# Patient Record
Sex: FEMALE | Race: OTHER | HISPANIC OR LATINO | Employment: UNEMPLOYED | URBAN - METROPOLITAN AREA
[De-identification: names, ages, dates, MRNs, and addresses within clinical notes are randomized per-mention and may not be internally consistent; named-entity substitution may affect disease eponyms.]

---

## 2017-11-13 ENCOUNTER — ALLSCRIPTS OFFICE VISIT (OUTPATIENT)
Dept: OTHER | Facility: OTHER | Age: 32
End: 2017-11-13

## 2017-11-13 ENCOUNTER — GENERIC CONVERSION - ENCOUNTER (OUTPATIENT)
Dept: OTHER | Facility: OTHER | Age: 32
End: 2017-11-13

## 2017-11-13 DIAGNOSIS — Z34.92 ENCOUNTER FOR SUPERVISION OF NORMAL PREGNANCY IN SECOND TRIMESTER: ICD-10-CM

## 2017-11-13 LAB — CFTR MUT ANL BLD/T: NORMAL

## 2017-11-20 ENCOUNTER — TRANSCRIBE ORDERS (OUTPATIENT)
Dept: LAB | Facility: HOSPITAL | Age: 32
End: 2017-11-20

## 2017-11-20 ENCOUNTER — APPOINTMENT (OUTPATIENT)
Dept: LAB | Facility: HOSPITAL | Age: 32
End: 2017-11-20

## 2017-11-20 DIAGNOSIS — Z34.92 ENCOUNTER FOR SUPERVISION OF NORMAL PREGNANCY IN SECOND TRIMESTER: ICD-10-CM

## 2017-11-20 LAB
ABO GROUP BLD: NORMAL
BACTERIA UR QL AUTO: NORMAL /HPF
BASOPHILS # BLD AUTO: 0.03 THOUSANDS/ΜL (ref 0–0.1)
BASOPHILS NFR BLD AUTO: 0 % (ref 0–1)
BILIRUB UR QL STRIP: NEGATIVE
BLD GP AB SCN SERPL QL: NEGATIVE
CLARITY UR: CLEAR
COLOR UR: YELLOW
EOSINOPHIL # BLD AUTO: 0.28 THOUSAND/ΜL (ref 0–0.61)
EOSINOPHIL NFR BLD AUTO: 3 % (ref 0–6)
ERYTHROCYTE [DISTWIDTH] IN BLOOD BY AUTOMATED COUNT: 12.9 % (ref 11.6–15.1)
EXTERNAL ABO GROUPING: NORMAL
EXTERNAL ANTIBODY SCREEN: NORMAL
EXTERNAL HEMATOCRIT: 40.2 %
EXTERNAL HEMOGLOBIN: 13.7 G/DL
EXTERNAL PLATELET COUNT: 332 K/ΜL
EXTERNAL RH FACTOR: POSITIVE
EXTERNAL SYPHILIS RPR SCREEN: NORMAL
GLUCOSE UR STRIP-MCNC: NEGATIVE MG/DL
HBV SURFACE AG SER QL: NORMAL
HCT VFR BLD AUTO: 40.2 % (ref 34.8–46.1)
HGB BLD-MCNC: 13.7 G/DL (ref 11.5–15.4)
HGB UR QL STRIP.AUTO: NEGATIVE
HYALINE CASTS #/AREA URNS LPF: NORMAL /LPF
KETONES UR STRIP-MCNC: NEGATIVE MG/DL
LEUKOCYTE ESTERASE UR QL STRIP: ABNORMAL
LYMPHOCYTES # BLD AUTO: 2.43 THOUSANDS/ΜL (ref 0.6–4.47)
LYMPHOCYTES NFR BLD AUTO: 23 % (ref 14–44)
Lab: NORMAL
MCH RBC QN AUTO: 29.9 PG (ref 26.8–34.3)
MCHC RBC AUTO-ENTMCNC: 34.1 G/DL (ref 31.4–37.4)
MCV RBC AUTO: 88 FL (ref 82–98)
MONOCYTES # BLD AUTO: 1.07 THOUSAND/ΜL (ref 0.17–1.22)
MONOCYTES NFR BLD AUTO: 10 % (ref 4–12)
NEUTROPHILS # BLD AUTO: 6.6 THOUSANDS/ΜL (ref 1.85–7.62)
NEUTS SEG NFR BLD AUTO: 64 % (ref 43–75)
NITRITE UR QL STRIP: NEGATIVE
NON-SQ EPI CELLS URNS QL MICRO: NORMAL /HPF
NRBC BLD AUTO-RTO: 0 /100 WBCS
PH UR STRIP.AUTO: 7 [PH] (ref 4.5–8)
PLATELET # BLD AUTO: 332 THOUSANDS/UL (ref 149–390)
PMV BLD AUTO: 9.6 FL (ref 8.9–12.7)
PROT UR STRIP-MCNC: NEGATIVE MG/DL
RBC # BLD AUTO: 4.58 MILLION/UL (ref 3.81–5.12)
RBC #/AREA URNS AUTO: NORMAL /HPF
RH BLD: POSITIVE
RUBV IGG SERPL IA-ACNC: 2.1 IU/ML
SP GR UR STRIP.AUTO: 1.02 (ref 1–1.03)
SPECIMEN EXPIRATION DATE: NORMAL
UROBILINOGEN UR QL STRIP.AUTO: 0.2 E.U./DL
WBC # BLD AUTO: 10.43 THOUSAND/UL (ref 4.31–10.16)
WBC #/AREA URNS AUTO: NORMAL /HPF

## 2017-11-20 PROCEDURE — 80081 OBSTETRIC PANEL INC HIV TSTG: CPT

## 2017-11-20 PROCEDURE — 86787 VARICELLA-ZOSTER ANTIBODY: CPT

## 2017-11-20 PROCEDURE — 87086 URINE CULTURE/COLONY COUNT: CPT

## 2017-11-20 PROCEDURE — 81001 URINALYSIS AUTO W/SCOPE: CPT

## 2017-11-20 PROCEDURE — 36415 COLL VENOUS BLD VENIPUNCTURE: CPT

## 2017-11-21 LAB
BACTERIA UR CULT: NORMAL
RPR SER QL: NORMAL
VZV IGG SER IA-ACNC: NORMAL

## 2017-11-22 LAB — HIV 1+2 AB+HIV1 P24 AG SERPL QL IA: NORMAL

## 2017-11-27 ENCOUNTER — GENERIC CONVERSION - ENCOUNTER (OUTPATIENT)
Dept: OTHER | Facility: OTHER | Age: 32
End: 2017-11-27

## 2017-12-06 ENCOUNTER — ALLSCRIPTS OFFICE VISIT (OUTPATIENT)
Dept: PERINATAL CARE | Facility: CLINIC | Age: 32
End: 2017-12-06

## 2017-12-06 ENCOUNTER — GENERIC CONVERSION - ENCOUNTER (OUTPATIENT)
Dept: OTHER | Facility: OTHER | Age: 32
End: 2017-12-06

## 2017-12-06 PROCEDURE — 90686 IIV4 VACC NO PRSV 0.5 ML IM: CPT | Performed by: OBSTETRICS & GYNECOLOGY

## 2017-12-06 PROCEDURE — 76805 OB US >/= 14 WKS SNGL FETUS: CPT | Performed by: OBSTETRICS & GYNECOLOGY

## 2017-12-06 PROCEDURE — 90686 IIV4 VACC NO PRSV 0.5 ML IM: CPT

## 2017-12-11 ENCOUNTER — APPOINTMENT (OUTPATIENT)
Dept: LAB | Facility: HOSPITAL | Age: 32
End: 2017-12-11

## 2017-12-11 ENCOUNTER — GENERIC CONVERSION - ENCOUNTER (OUTPATIENT)
Dept: OTHER | Facility: OTHER | Age: 32
End: 2017-12-11

## 2017-12-11 DIAGNOSIS — Z34.92 ENCOUNTER FOR SUPERVISION OF NORMAL PREGNANCY IN SECOND TRIMESTER: ICD-10-CM

## 2017-12-11 PROCEDURE — 87591 N.GONORRHOEAE DNA AMP PROB: CPT

## 2017-12-11 PROCEDURE — G0145 SCR C/V CYTO,THINLAYER,RESCR: HCPCS

## 2017-12-11 PROCEDURE — 87491 CHLMYD TRACH DNA AMP PROBE: CPT

## 2017-12-11 PROCEDURE — 87624 HPV HI-RISK TYP POOLED RSLT: CPT

## 2017-12-13 LAB
CHLAMYDIA DNA CVX QL NAA+PROBE: NORMAL
N GONORRHOEA DNA GENITAL QL NAA+PROBE: NORMAL

## 2017-12-15 LAB — HPV RRNA GENITAL QL NAA+PROBE: NORMAL

## 2017-12-18 LAB
LAB AP GYN PRIMARY INTERPRETATION: NORMAL
Lab: NORMAL

## 2017-12-28 ENCOUNTER — GENERIC CONVERSION - ENCOUNTER (OUTPATIENT)
Dept: OTHER | Facility: OTHER | Age: 32
End: 2017-12-28

## 2018-01-09 ENCOUNTER — OB ABSTRACT (OUTPATIENT)
Dept: OBGYN CLINIC | Facility: HOSPITAL | Age: 33
End: 2018-01-09

## 2018-01-09 PROBLEM — O09.899 RUBELLA NON-IMMUNE STATUS, ANTEPARTUM: Status: ACTIVE | Noted: 2018-01-09

## 2018-01-09 PROBLEM — Z28.39 RUBELLA NON-IMMUNE STATUS, ANTEPARTUM: Status: ACTIVE | Noted: 2018-01-09

## 2018-01-15 ENCOUNTER — GENERIC CONVERSION - ENCOUNTER (OUTPATIENT)
Dept: OTHER | Facility: OTHER | Age: 33
End: 2018-01-15

## 2018-01-15 ENCOUNTER — APPOINTMENT (OUTPATIENT)
Dept: PERINATAL CARE | Facility: CLINIC | Age: 33
End: 2018-01-15

## 2018-01-15 PROCEDURE — 76805 OB US >/= 14 WKS SNGL FETUS: CPT | Performed by: OBSTETRICS & GYNECOLOGY

## 2018-01-22 VITALS
HEART RATE: 74 BPM | SYSTOLIC BLOOD PRESSURE: 128 MMHG | RESPIRATION RATE: 18 BRPM | WEIGHT: 129.8 LBS | HEIGHT: 63 IN | BODY MASS INDEX: 23 KG/M2 | DIASTOLIC BLOOD PRESSURE: 78 MMHG

## 2018-01-23 NOTE — PROGRESS NOTES
DEC 6 2017         RE: [facility]                       To: Inna [address]/Matthew   MR#: [id number]                                  [address]   : [address], [address]: [id number]                             Fax: [phone]   (Exam #: [id number])      The LMP of this 29year old,  G4, P3-0-0-3 patient was AUG 13 2017, giving   her an SAVAGE of MAY 20 2018 and a current gestational age of 12 weeks 3 days   by dates  A sonographic examination was performed on DEC 6 2017 using real   time equipment  The ultrasound examination was performed using abdominal   technique  The patient has a BMI of 24 4  Her blood pressure today was   110/60  Cardiac motion was observed at 148 bpm       INDICATIONS      pregnancy dating   long  interval pregnancy   Prior small for gestational age   early fetal anatomy and growth      Exam Types      Level I      RESULTS      Fetus # 1 of 1   Vertex presentation   Placenta Location = Anterior   Placenta Grade = I      MEASUREMENTS (* Included In Average GA)      AC              10 3 cm        15 weeks 6 days* (48%)   BPD              3 5 cm        16 weeks 6 days*   HC              13 1 cm        16 weeks 4 days*   Femur            2 1 cm        16 weeks 1 day *      Nuchal Fold      2 6 mm      Humerus          2 1 cm        16 weeks 4 days  (55%)      Cerebellum       1 6 cm        16 weeks 3 days   CisternaMagna    3 4 mm      HC/AC           1 27   FL/AC           0 20   FL/BPD          0 59   EFW (Ac/Fl/Hc)   150 grams - 0 lbs 5 oz      THE AVERAGE GESTATIONAL AGE is 16 weeks 3 days +/- 7 days  AMNIOTIC FLUID      Amniotic Fluid: Normal      ANATOMY      Head                                    See Details   Th  Cav                                  Normal   Heart                                   See Details   Stomach                                 Normal   Right Kidney Normal   Left Kidney                             Normal   Bladder                                 Normal   Abd  Wall                               Normal   Spine                                   Normal   Extrems                                 Normal   Genitalia                               Normal   Placenta                                Normal   Umbl  Cord                              Normal   Uterus                                  Normal   PCI                                     Normal      ANATOMY DETAILS      Visualized Appearing Sonographically Normal:   HEAD: (Calvarium, BPD Level, Cavum, Choroid Plexus, Cerebellum, Cisterna   Magna);    TH  CAV  : (Lungs, Diaphragm); HEART: (Four Chamber View,   Proximal Left Outflow, Proximal Right Outflow, 3VV, Short Maynard of Greater   Vessels, Ductal Arch, Aortic Arch, Cardiac Axis, Cardiac Position);      STOMACH, RIGHT KIDNEY, LEFT KIDNEY, BLADDER, ABD  WALL, SPINE: (Cervical   Spine, Thoracic Spine, Lumbar Spine, Sacrum);    EXTREMS: (Lt Humerus, Rt   Humerus, Lt Forearm, Rt Forearm, Lt Hand, Rt Hand, Lt Femur, Rt Femur, Lt   Low Leg, Rt Low Leg, Lt Foot, Rt Foot);    GENITALIA (Male), PLACENTA,   UMBL  CORD, UTERUS, PCI      Not Visualized:   HEAD: (Lateral Ventricles); HEART: (3 Vessel Trachea, Interventricular   Septum, Interatrial Septum, IVC, SVC)      ADNEXA      The left ovary appeared normal and measured 2 2 x 2 7 x 1 9 cm with a   volume of 5 9 cc  The right ovary appeared normal and measured 2 5 x 1 7 x   1 3 cm with a volume of 2 9 cc  IMPRESSION      Diaz IUP   16 weeks and 3 days by this ultrasound  (SAVAGE=MAY 20 2018)   Vertex presentation   Regular fetal heart rate of 148 bpm   Anterior placenta      GENERAL COMMENT      Ms Sheryl Posada is here for pregnancy dating  She registered the   pregnancy on 11/13/17 and has 3 prior uncomplicated deliveries in [place]    In the office she declined CF screening and Sequential Screen  There is a single live intrauterine pregnancy with biometry consistent   with LMP  No gross anomalies were identified on limited views  Amniotic fluid is within normal limits  Evaluation and Management:   The patient was counseled regarding the above findings  A total of 10   minutes were spent in this encounter with >50% of the time spent in   face-to-face counseling and in coordination of care  The limitations of   ultrasound were reviewed  Telephone  #[phone] was used  She should return in 5 weeks for anatomic survey  We discussed aneuploidy screening options (Quad screen) and she declines  I strongly recommended consideration of influenza vaccination in pregnancy   and this was administered prior to her departure  At the conclusion of today's encounter, all questions were answered to her   satisfaction  Thank you very much for this kind referral and please do   not hesitate to contact me with any further questions or concerns  JUAN Phan M D     Maternal Fetal Medicine   Electronically signed 12/06/17 11:05

## 2018-01-24 VITALS
HEART RATE: 91 BPM | BODY MASS INDEX: 24.09 KG/M2 | WEIGHT: 136 LBS | DIASTOLIC BLOOD PRESSURE: 67 MMHG | SYSTOLIC BLOOD PRESSURE: 115 MMHG

## 2018-01-24 VITALS
WEIGHT: 136 LBS | HEART RATE: 101 BPM | DIASTOLIC BLOOD PRESSURE: 55 MMHG | SYSTOLIC BLOOD PRESSURE: 105 MMHG | BODY MASS INDEX: 24.09 KG/M2

## 2018-01-24 VITALS
HEIGHT: 63 IN | SYSTOLIC BLOOD PRESSURE: 110 MMHG | WEIGHT: 138 LBS | DIASTOLIC BLOOD PRESSURE: 60 MMHG | BODY MASS INDEX: 24.45 KG/M2

## 2018-01-24 VITALS
WEIGHT: 142 LBS | HEIGHT: 63 IN | SYSTOLIC BLOOD PRESSURE: 114 MMHG | BODY MASS INDEX: 25.16 KG/M2 | DIASTOLIC BLOOD PRESSURE: 65 MMHG

## 2018-01-24 NOTE — PROGRESS NOTES
MONIAC 15 2018         RE: [facility]                       To: Macie Silva   MR#: [id number]                                  [address]   : [address] [facility] [address], [address] [facility]   ENC: [id number]                             Fax: [phone]   (Exam #: [id number])      The LMP of this 29year old,  G4, P3-0-0-3 patient was AUG 13 2017, giving   her an SAVAGE of MAY 20 2018 and a current gestational age of 25 weeks 1 day   by dates  A sonographic examination was performed on MONICA 15 2018 using   real time equipment  The ultrasound examination was performed using   abdominal technique  The patient has a BMI of 25 2  Her blood pressure   today was 114/65  Cardiac motion was observed at 140 bpm       INDICATIONS      long  interval pregnancy   Prior small for gestational age   fetal anatomical survey      Exam Types      LEVEL II      RESULTS      Fetus # 1 of 1   Vertex presentation   Fetal growth appeared normal   Placenta Location = Anterior   No placenta previa   Placenta Grade = I      MEASUREMENTS (* Included In Average GA)      AC              16 5 cm        21 weeks 2 days* (30%)   BPD              5 5 cm        22 weeks 5 days* (67%)   HC              19 9 cm        21 weeks 6 days* (43%)   Femur            3 7 cm        22 weeks 1 day * (42%)      NBL              5 8 mm      Humerus          3 5 cm        21 weeks 6 days  (46%)      Cerebellum       2 3 cm        22 weeks 2 days   Biorbit          3 3 cm        21 weeks 3 days   CisternaMagna    3 6 mm      HC/AC           1 20   FL/AC           0 23   FL/BPD          0 68   EFW (Ac/Fl/Hc)   447 grams - 0 lbs 15 oz                 (26%)      THE AVERAGE GESTATIONAL AGE is 22 weeks 0 days +/- 14 days  AMNIOTIC FLUID         Largest Vertical Pocket = 4 5 cm   Amniotic Fluid: Normal      CERVICAL EVALUATION      The cervix appeared normal (Ultrasound Examination)        SUPINE Cervical Length: 3 50 cm      OTHER TEST RESULTS     Dynamic Changes?: No      ANATOMY      Head                                    Normal   Face/Neck                               See Details   Th  Cav  Normal   Heart                                   Normal   Abd  Cav  Normal   Stomach                                 Normal   Right Kidney                            Normal   Left Kidney                             Normal   Bladder                                 Normal   Abd  Wall                               Normal   Spine                                   Normal   Extrems                                 See Details   Genitalia                               Normal   Placenta                                Normal   Umbl  Cord                              Normal   Uterus                                  Normal   PCI                                     Normal      ANATOMY DETAILS      Visualized Appearing Sonographically Normal:   HEAD: (Calvarium, BPD Level, Cavum, Lateral Ventricles, Choroid Plexus,   Cerebellum, Cisterna Magna);    FACE/NECK: (Neck, Profile, Orbits,   Nose/Lips, Palate, Face);    TH  CAV  : (Lungs, Diaphragm); HEART: (Four   Chamber View, Proximal Left Outflow, Proximal Right Outflow, 3VV, 3 Vessel   Trachea, Short Axis of Greater Vessels, Ductal Arch, Aortic Arch,   Interventricular Septum, Interatrial Septum, IVC, SVC, Cardiac Axis,   Cardiac Position);    ABD  CAV : (Liver);    STOMACH, RIGHT KIDNEY, LEFT   KIDNEY, BLADDER, ABD  WALL, SPINE: (Cervical Spine, Thoracic Spine, Lumbar   Spine, Sacrum);    EXTREMS: (Lt Humerus, Rt Humerus, Lt Forearm, Rt   Forearm, Lt Hand, Lt Femur, Rt Femur, Lt Low Leg, Rt Low Leg, Lt Foot, Rt   Foot);    GENITALIA (Male), PLACENTA, UMBL   CORD, UTERUS, PCI      Not Visualized:   EXTREMS: (Rt Hand)      ADNEXA      The left ovary appeared normal and measured 3 0 x 1 7 x 1 5 cm with a   volume of 4 0 cc  The right ovary appeared normal and measured 2 8 x 2 0 x   1 6 cm with a volume of 4 5 cc  IMPRESSION      Diza IUP   22 weeks and 0 days by this ultrasound  (SAVAGE=MAY 21 2018)   Vertex presentation   Fetal growth appeared normal   Regular fetal heart rate of 140 bpm   Anterior placenta   No placenta previa      GENERAL COMMENT      Ms Kervin Robles is here for anatomy survey  There is a single live intrauterine pregnancy with size equivalent to   dates  No gross anomalies were identified  The right upper extremity was not   fully evaluated secondary to fetal position however this was visualized as   normal at her 16 week scan  Amniotic fluid is within normal limits  She declined transvaginal cervical length measurement  Evaluation and Management:   The patient was counseled regarding the above findings  A total of 10   minutes were spent in this encounter with >50% of the time spent in   face-to-face counseling and in coordination of care  The limitations of   ultrasound were reviewed  Yoruba telephone  #[phone] was used  We discussed that while the presence of a normal appearing ultrasound is   reassuring, it does not completely preclude the presence of aneuploidy or   malformation  She can continue pregnancy with expectant management without further   ultrasounds unless an additional indication or concern arises  At the conclusion of today's encounter, all questions were answered to her   satisfaction  Thank you very much for this kind referral and please do   not hesitate to contact me with any further questions or concerns  Laurence North, R D M S Birt Najjar, M D     Maternal Fetal Medicine   Electronically signed 01/15/18 15:56

## 2018-01-30 ENCOUNTER — OFFICE VISIT (OUTPATIENT)
Dept: OBGYN CLINIC | Facility: CLINIC | Age: 33
End: 2018-01-30

## 2018-01-30 VITALS — WEIGHT: 141 LBS | DIASTOLIC BLOOD PRESSURE: 70 MMHG | BODY MASS INDEX: 24.98 KG/M2 | SYSTOLIC BLOOD PRESSURE: 111 MMHG

## 2018-01-30 DIAGNOSIS — Z28.39 RUBELLA NON-IMMUNE STATUS, ANTEPARTUM: ICD-10-CM

## 2018-01-30 DIAGNOSIS — O09.899 RUBELLA NON-IMMUNE STATUS, ANTEPARTUM: ICD-10-CM

## 2018-01-30 DIAGNOSIS — Z3A.24 24 WEEKS GESTATION OF PREGNANCY: Primary | ICD-10-CM

## 2018-01-30 PROCEDURE — 99213 OFFICE O/P EST LOW 20 MIN: CPT | Performed by: FAMILY MEDICINE

## 2018-01-30 NOTE — PATIENT INSTRUCTIONS
El embarazo de la semana 31 a la 34   CUIDADO AMBULATORIO:   Qué cambios están ocurriendo en green cuerpo:  Es posible que usted continúe teniendo síntomas tales stefanie falta de [place], [place], contracciones o inflamación en xochilt tobillos y pies  Usted podría estar aumentando 1 penny por semana ahora  Busque atención médica de inmediato si:   · Usted presenta un madie dolor de michael que no desaparece  · Usted tiene cambios en la visión nuevos o en aumento, stefanie visión borrosa o con manchas  · Usted tiene inflamación nueva o creciente en green max o vickie  · Usted tiene manchado o sangrado vaginal     · Usted rompe tristin o siente un chorro o gotas de agua tibia que le está bajando por green vagina  Pregúntele a green [employer] vitaminas y minerales son adecuados para usted  · Usted tiene más de 5 contracciones en 1 hora  · Usted nota algún cambio en los movimientos de green bebé  · Usted tiene calambres, presión o tensión abdominal     · Usted tiene un cambio en la secreción vaginal     · Usted tiene escalofríos o fiebre  · Usted tiene comezón, ardor o dolor vaginal      · Usted tiene adam secreción vaginal amarillenta, verdosa, violeta o de [employer]  · Usted tiene dolor o ardor al Sherran Curls, orina menos de lo habitual o tiene [place] rosada o sanguinolenta  · Usted tiene preguntas o inquietudes acerca de green condición o cuidado  Cómo cuidarse en esta etapa de green embarazo:   · Consuma alimentos saludables y variados  Alimentos saludables incluyen frutas, verduras, panes de mike integral, alimentos lácteos bajos en grasa, frijoles, sultana magras y pescado  Nageezi líquidos stefanie se le haya indicado  Pregunte cuánto líquido debe rafa cada día y cuáles líquidos son los más adecuados para usted  Limite el consumo de cafeína a menos de [address]  Limite el consumo de pescado a 2 porciones cada semana   Escoja pescado con concentraciones bajas de mis stefanie atún al natural [place] ham, salmón, bacalao o tilapia  No  coma pescado con concentraciones altas de mis stefanie pez peggy, caballa gigante, pargo rayado y tiburón  · Controle la acidez  comiendo 4 o 5 comidas pequeñas cada día en vez de comidas grandes  Evite los alimentos picantes  · Controle la inflamación  al [place] y poner los pies en alto o elevados sobre [place]  · [id number]  Green necesidad de ciertas vitaminas y [facility], stefanie el ácido fólico, aumenta bright el [facility]  Las vitaminas prenatales proporcionan algunas de las vitaminas y minerales adicionales que usted necesita  Las vitaminas prenatales también podrían ayudar a disminuir el riesgo de ciertos defectos de nacimiento  · Consulte con green médico acerca de hacer ejercicio  El ejercicio moderado puede ayudarla a mantenerse en forma  Green médico la ayudará a planear un programa de ejercicios que sea seguro para usted bright green [facility]  · No fume  Si usted fuma, nunca es demasiado tarde para dejar de hacerlo  Fumar aumenta el riesgo de aborto espontáneo y otros problemas de anne bright green [facility]  Fumar puede causar que green bebé nazca antes de tiempo o que pese menos al nacer  Solicite información a green médico si usted necesita ayuda para dejar de fumar  · No consuma alcohol  El alcohol pasa de green cuerpo al bebé a través de la placenta  Puede afectar el desarrollo del cerebro de green bebé y provocar el síndrome de alcoholismo fetal (SAF)  FAS es un mahendra de condiciones que causan [address], de comportamiento y de crecimiento  · Consulte con green médico antes de rafa cualquier medicamento  Muchos medicamentos pueden perjudicar a green bebé si usted los paulo [address]  No tome ningún medicamento, vitaminas, hierbas o suplementos sin ricci consultar con green Ale Churn  use drogas ilegales o de la moraes (stefanie marihuana o cocaína) mientras está embarazada    Consejos de seguridad bright el embarazo:   · Evite jacuzzis y saunas  No use un jacuzzi o un sauna mientras usted está embarazada, especialmente bright el primer trimestre  Los [facility] y los saunas aumentan la temperatura de iraheta bebé y el riesgo de defectos de nacimiento  · Evite la toxoplasmosis  [place] es adam infección causada por comer carne cruda o estar cerca del excremento de un darci infectado  [place] puede causar malformaciones congénitas, aborto espontáneo y Elieser Schein  Lávese las vickie después de tocar carne cruda  Asegúrese de que la carne esté drea cocida antes de comerla  Evite los huevos crudos y la Cristian Redding  Use guantes o pida que alguien la ayude a limpiar la caja de arena del darci mientras usted [place]  Cambios que están ocurriendo con iraheta bebé:  Para las 34 semanas, iraheta bebé podría pesar más de 5 [address]  Iraheta bebé medirá alrededor de 12 ½ pulgadas de pete desde el sony de la michael hasta la rabadilla (parte inferior del bebé)  Iraheta bebé está aumentando alrededor de ½ penny por semana  Ahora iraheta bebé puede abrir y cerrar xochilt ojos  Las patadas y movimientos de iraheta bebé son más lyn en tyler momento  Lo que necesita saber acerca del cuidado prenatal:  Iraheta médico le revisará iraheta presión arterial y [place]  Es posible que también necesite lo siguiente:  · Un examen de orina  también podría realizarse para revisarle el azúcar y la proteína  Estas son señales de diabetes gestacional o de infección  La proteína en iraheta orina también podría ser adam señal de preeclampsia  La preeclampsia es adam condición que puede desarrollarse bright la semana 21 o después en iraheta embarazo  Esta provoca presión arterial avelino y [employer] con xochilt riñones y [place]  · La vacuna Tdap  podría ser recomendado por iraheta médico      · La altura uterina  es adam medición del útero para controlar el desarrollo de iraheta bebé  [employer] por lo general es igual al número de [address] [place] que usted tiene de embarazo   [address] podría revisar la posición de iraheta bebé  · El ritmo cardíaco de iraheta bebé  será revisado  © 2017 [address] Information is for End User's use only and may not be sold, redistributed or otherwise used for commercial purposes  All illustrations and images included in CareNotes® are the copyrighted property of A D A M , Inc  or Brian Sanderson  Esta información es sólo para uso en educación  Iraheta intención no es darle un consejo médico sobre enfermedades o tratamientos  Colsulte con iraheta Sharon Maximino farmacéutico antes de seguir cualquier régimen médico para saber si es seguro y efectivo para usted

## 2018-01-30 NOTE — PROGRESS NOTES
Attending Note:  I discussed the case with the resident and reviewed the resident's note  I was present in the clinic and supervised the resident, I agree with the resident management plan as it was presented to me  I interviewed and examined the patient: no   I agree with the resident's documentation  Order 28 weeks labs, follow up 4 weeks

## 2018-02-01 PROBLEM — Z3A.24 24 WEEKS GESTATION OF PREGNANCY: Status: RESOLVED | Noted: 2018-01-30 | Resolved: 2018-02-01

## 2018-02-22 ENCOUNTER — ROUTINE PRENATAL (OUTPATIENT)
Dept: OBGYN CLINIC | Facility: CLINIC | Age: 33
End: 2018-02-22

## 2018-02-22 VITALS — BODY MASS INDEX: 25.61 KG/M2 | DIASTOLIC BLOOD PRESSURE: 73 MMHG | SYSTOLIC BLOOD PRESSURE: 116 MMHG | WEIGHT: 144.6 LBS

## 2018-02-22 DIAGNOSIS — Z23 NEED FOR TDAP VACCINATION: ICD-10-CM

## 2018-02-22 DIAGNOSIS — Z3A.27 27 WEEKS GESTATION OF PREGNANCY: Primary | ICD-10-CM

## 2018-02-22 DIAGNOSIS — Z34.82 ENCOUNTER FOR SUPERVISION OF OTHER NORMAL PREGNANCY, SECOND TRIMESTER: ICD-10-CM

## 2018-02-22 LAB
SL AMB  POCT GLUCOSE, UA: ABNORMAL
SL AMB LEUKOCYTE ESTERASE,UA: ABNORMAL
SL AMB POCT URINE PROTEIN: ABNORMAL

## 2018-02-22 PROCEDURE — 90471 IMMUNIZATION ADMIN: CPT

## 2018-02-22 PROCEDURE — 99213 OFFICE O/P EST LOW 20 MIN: CPT | Performed by: OBSTETRICS & GYNECOLOGY

## 2018-02-22 PROCEDURE — 90715 TDAP VACCINE 7 YRS/> IM: CPT

## 2018-02-22 PROCEDURE — 81002 URINALYSIS NONAUTO W/O SCOPE: CPT | Performed by: OBSTETRICS & GYNECOLOGY

## 2018-02-22 RX ORDER — MULTIVITAMIN
1 TABLET ORAL DAILY
Qty: 90 EACH | Refills: 1 | Status: SHIPPED | OUTPATIENT
Start: 2018-02-22 | End: 2018-05-23

## 2018-02-22 NOTE — PROGRESS NOTES
28 yo uninsured   at 27'4 weeks gestation, unremarkable pregnancy  Didn't obtain her CBC, GTT, RPR because she was unsure of where to go/how to proceed  No concerns  No bleeding, LOF, contractions  Good FM  Given [place] handouts today for hospital locations to obtain her labs, and she was instructed to go to the pharmacist and ask for generic prenatal vitamins  Tdap given today  Flu shot was given last   Kick counts discussed  F/u 2 weeks

## 2018-02-26 ENCOUNTER — APPOINTMENT (OUTPATIENT)
Dept: LAB | Facility: CLINIC | Age: 33
End: 2018-02-26

## 2018-02-26 DIAGNOSIS — Z3A.24 24 WEEKS GESTATION OF PREGNANCY: ICD-10-CM

## 2018-02-26 LAB
ERYTHROCYTE [DISTWIDTH] IN BLOOD BY AUTOMATED COUNT: 13.7 % (ref 11.6–15.1)
GLUCOSE 1H P 50 G GLC PO SERPL-MCNC: 92 MG/DL
HCT VFR BLD AUTO: 38.3 % (ref 34.8–46.1)
HGB BLD-MCNC: 12.3 G/DL (ref 11.5–15.4)
MCH RBC QN AUTO: 28 PG (ref 26.8–34.3)
MCHC RBC AUTO-ENTMCNC: 32.1 G/DL (ref 31.4–37.4)
MCV RBC AUTO: 87 FL (ref 82–98)
PLATELET # BLD AUTO: 319 THOUSANDS/UL (ref 149–390)
PMV BLD AUTO: 9.6 FL (ref 8.9–12.7)
RBC # BLD AUTO: 4.39 MILLION/UL (ref 3.81–5.12)
WBC # BLD AUTO: 10.69 THOUSAND/UL (ref 4.31–10.16)

## 2018-02-26 PROCEDURE — 86592 SYPHILIS TEST NON-TREP QUAL: CPT

## 2018-02-26 PROCEDURE — 82950 GLUCOSE TEST: CPT

## 2018-02-26 PROCEDURE — 36415 COLL VENOUS BLD VENIPUNCTURE: CPT

## 2018-02-26 PROCEDURE — 85027 COMPLETE CBC AUTOMATED: CPT

## 2018-02-27 LAB — RPR SER QL: NORMAL

## 2018-03-07 NOTE — PROGRESS NOTES
Education  Baby & Me Education 2nd Trimester:   Second Trimester Education provided:   benefits of breastfeeding, importance of exclusive breastfeeding, early initiation of breastfeeding, exclusive breastfeeding for the first 6 months and rooming-in on 24 hour basis  Mother has not registered for prenatal class  Thought has not been given to selecting a pediatrician  The mother has not discussed personal preferences with her provider  Skin-to-skin, lactation consultant in the [address] [place] [address] breastfeeding handouts given     Prenatal education provided by: Fortino Kerns RN      Signatures   Electronically signed by : Fortino Kerns RN; Nov 13 2017  2:58PM EST                       (Author)

## 2018-03-12 ENCOUNTER — ROUTINE PRENATAL (OUTPATIENT)
Dept: OBGYN CLINIC | Facility: CLINIC | Age: 33
End: 2018-03-12

## 2018-03-12 VITALS — BODY MASS INDEX: 26.04 KG/M2 | SYSTOLIC BLOOD PRESSURE: 109 MMHG | DIASTOLIC BLOOD PRESSURE: 67 MMHG | WEIGHT: 147 LBS

## 2018-03-12 DIAGNOSIS — O09.899 RUBELLA NON-IMMUNE STATUS, ANTEPARTUM: ICD-10-CM

## 2018-03-12 DIAGNOSIS — Z34.83 ENCOUNTER FOR SUPERVISION OF OTHER NORMAL PREGNANCY, THIRD TRIMESTER: Primary | ICD-10-CM

## 2018-03-12 DIAGNOSIS — Z28.39 RUBELLA NON-IMMUNE STATUS, ANTEPARTUM: ICD-10-CM

## 2018-03-12 LAB
SL AMB  POCT GLUCOSE, UA: ABNORMAL
SL AMB LEUKOCYTE ESTERASE,UA: ABNORMAL
SL AMB POCT BILIRUBIN,UA: ABNORMAL
SL AMB POCT BLOOD,UA: ABNORMAL
SL AMB POCT PH,UA: 8.5
SL AMB POCT URINE PROTEIN: ABNORMAL

## 2018-03-12 PROCEDURE — 99213 OFFICE O/P EST LOW 20 MIN: CPT | Performed by: NURSE PRACTITIONER

## 2018-03-12 PROCEDURE — 81002 URINALYSIS NONAUTO W/O SCOPE: CPT | Performed by: NURSE PRACTITIONER

## 2018-03-12 NOTE — PROGRESS NOTES
[id number] any used  Patient denies any concerns  Feels good fetal movement, denies VB, or LOF  Denies any contractions  Received Tdap at last visit  Had 28 week labs done that were within normal, her 1 hour GTT was 92, and her hemoglobin was 12 3  Her blood type is O-positive  She had a ultrasound done 01/05/2018 and no further ultrasounds are scheduled    IUP at 30 weeks and 1 day, teaching sheet in Japanese for fetal kick counts given to patient  Rubella non immune  Pregnancy and birth information packet given to patient, list of classes was given to patient  Reviewed precautions to call with    Return to office in 2 weeks

## 2018-03-12 NOTE — PATIENT INSTRUCTIONS
El embarazo de la semana 31 a la 34   CUIDADO AMBULATORIO:   Qué cambios están ocurriendo en green cuerpo:  Es posible que usted continúe teniendo síntomas tales stefanie falta de Knebel, George, contracciones o inflamación en xochilt tobillos y pies  Usted podría estar aumentando 1 penny por semana ahora  Busque atención médica de inmediato si:   · Usted presenta un madie dolor de michael que no desaparece  · Usted tiene cambios en la visión nuevos o en aumento, stefanie visión borrosa o con manchas  · Usted tiene inflamación nueva o creciente en green max o vickie  · Usted tiene manchado o sangrado vaginal     · Usted rompe tristin o siente un chorro o gotas de agua tibia que le está bajando por green vagina  Pregúntele a green [place] vitaminas y minerales son adecuados para usted  · Usted tiene más de 5 contracciones en 1 hora  · Usted nota algún cambio en los movimientos de green bebé  · Usted tiene calambres, presión o tensión abdominal     · Usted tiene un cambio en la secreción vaginal     · Usted tiene escalofríos o fiebre  · Usted tiene comezón, ardor o dolor vaginal      · Usted tiene adam secreción vaginal amarillenta, verdosa, violeta o de [employer]  · Usted tiene dolor o ardor al Artelia Lean, orina menos de lo habitual o tiene [place] rosada o sanguinolenta  · Usted tiene preguntas o inquietudes acerca de green condición o cuidado  Cómo cuidarse en esta etapa de green embarazo:   · Consuma alimentos saludables y variados  Alimentos saludables incluyen frutas, verduras, panes de mike integral, alimentos lácteos bajos en grasa, frijoles, sultana magras y pescado  [place] líquidos stefanie se le haya indicado  Pregunte cuánto líquido debe rafa cada día y cuáles líquidos son los más adecuados para usted  Limite el consumo de cafeína a menos de [address]  Limite el consumo de pescado a 2 porciones cada semana   Escoja pescado con concentraciones bajas de mis stefanie atún al natural [place] ham, salmón, bacalao o tilapia  No  coma pescado con concentraciones altas de mis stefanie pez peggy, caballa gigante, pargo rayado y tiburón  · Controle la acidez  comiendo 4 o 5 comidas pequeñas cada día en vez de comidas grandes  Evite los alimentos picantes  · Controle la inflamación  al [place] y poner los pies en alto o elevados sobre [place]  · [address] [address] [place]  Green necesidad de ciertas vitaminas y [facility], stefanie el ácido fólico, aumenta bright el [facility]  Las vitaminas prenatales proporcionan algunas de las vitaminas y minerales adicionales que usted necesita  Las vitaminas prenatales también podrían ayudar a disminuir el riesgo de ciertos defectos de nacimiento  · Consulte con green médico acerca de hacer ejercicio  El ejercicio moderado puede ayudarla a mantenerse en forma  Green médico la ayudará a planear un programa de ejercicios que sea seguro para usted bright green [facility]  · No fume  Si usted fuma, nunca es demasiado tarde para dejar de hacerlo  Fumar aumenta el riesgo de aborto espontáneo y otros problemas de anne bright green [facility]  Fumar puede causar que green bebé nazca antes de tiempo o que pese menos al nacer  Solicite información a green médico si usted necesita ayuda para dejar de fumar  · No consuma alcohol  El alcohol pasa de green cuerpo al bebé a través de la placenta  Puede afectar el desarrollo del cerebro de green bebé y provocar el síndrome de alcoholismo fetal (SAF)  FAS es un mahendra de condiciones que causan [address], de comportamiento y de crecimiento  · Consulte con green médico antes de arfa cualquier medicamento  Muchos medicamentos pueden perjudicar a green bebé si usted los paulo [address]  No tome ningún medicamento, vitaminas, hierbas o suplementos sin ricci consultar con green José Sensor  use drogas ilegales o de la moraes (stefanie marihuana o cocaína) mientras está embarazada    Consejos de seguridad bright el embarazo:   · Evite jacuzzis y saunas  No use un jacuzzi o un sauna mientras usted está embarazada, especialmente bright el primer trimestre  Los [facility] y los saunas aumentan la temperatura de iraheta bebé y el riesgo de defectos de nacimiento  · Evite la toxoplasmosis  [place] es adam infección causada por comer carne cruda o estar cerca del excremento de un darci infectado  [place] puede causar malformaciones congénitas, aborto espontáneo y Elieser Schein  Lávese las vickie después de tocar carne cruda  Asegúrese de que la carne esté drea cocida antes de comerla  Evite los huevos crudos y la [place]ave [place]  Use guantes o pida que alguien la ayude a limpiar la caja de arena del darci mientras usted [employer]  Cambios que están ocurriendo con iraheta bebé:  Para las 34 semanas, iraheta bebé podría pesar más de [address]  Iraheta bebé medirá alrededor de 12 ½ pulgadas de pete desde el sony de la michael hasta la rabadilla (parte inferior del bebé)  Iraheta bebé está aumentando alrededor de ½ penny por semana  Ahora iraheta bebé puede abrir y cerrar xochilt ojos  Las patadas y movimientos de iraheta bebé son más lyn en tyler momento  Lo que necesita saber acerca del cuidado prenatal:  Iraheta médico le revisará iraheta presión arterial y [place]  Es posible que también necesite lo siguiente:  · Un examen de orina  también podría realizarse para revisarle el azúcar y la proteína  Estas son señales de diabetes gestacional o de infección  La proteína en iraheta orina también podría ser adam señal de preeclampsia  La preeclampsia es adam condición que puede desarrollarse bright la semana 21 o después en iraheta embarazo  Esta provoca presión arterial avelino y [employer] con xochilt riñones y [place]  · La vacuna Tdap  podría ser recomendado por iraheta médico      · La altura uterina  es adam medición del útero para controlar el desarrollo de iraheta bebé  [employer] por lo general es igual al número de [address] que usted tiene de embarazo   [address] podría revisar la posición de iraheta bebé  · El ritmo cardíaco de iraheta bebé  será revisado  © 2017 [address] Information is for End User's use only and may not be sold, redistributed or otherwise used for commercial purposes  All illustrations and images included in CareNotes® are the copyrighted property of A D A M , Inc  or Brian Sanderson  Esta información es sólo para uso en educación  Iraheta intención no es darle un consejo médico sobre enfermedades o tratamientos  Colsulte con iraheta Ozie Sharp farmacéutico antes de seguir cualquier régimen médico para saber si es seguro y efectivo para usted

## 2018-04-02 ENCOUNTER — ROUTINE PRENATAL (OUTPATIENT)
Dept: OBGYN CLINIC | Facility: CLINIC | Age: 33
End: 2018-04-02

## 2018-04-02 VITALS — SYSTOLIC BLOOD PRESSURE: 113 MMHG | DIASTOLIC BLOOD PRESSURE: 77 MMHG

## 2018-04-02 DIAGNOSIS — Z34.83 ENCOUNTER FOR SUPERVISION OF OTHER NORMAL PREGNANCY IN THIRD TRIMESTER: ICD-10-CM

## 2018-04-02 DIAGNOSIS — O09.899 RUBELLA NON-IMMUNE STATUS, ANTEPARTUM: Primary | ICD-10-CM

## 2018-04-02 DIAGNOSIS — Z3A.33 33 WEEKS GESTATION OF PREGNANCY: ICD-10-CM

## 2018-04-02 DIAGNOSIS — Z28.39 RUBELLA NON-IMMUNE STATUS, ANTEPARTUM: Primary | ICD-10-CM

## 2018-04-02 PROCEDURE — 99213 OFFICE O/P EST LOW 20 MIN: CPT | Performed by: NURSE PRACTITIONER

## 2018-04-02 PROCEDURE — 81002 URINALYSIS NONAUTO W/O SCOPE: CPT | Performed by: NURSE PRACTITIONER

## 2018-04-02 NOTE — PROGRESS NOTES
1  Prenatal routine visit questions  a  N/V/D: no  b  Constipation: no  c  Headaches: no  d  Vaginal bleeding: no  e  Vaginal discharge: no  f  Leaking of fluid: no  g  Contractions: no  h  Fetal movement: Kick counts reviewed  Feels good FM  i  Edema: yes      [id number] used  RTO in 2 wks, reviewed precautions  Previously received TDAP  Rubella non immune  Check in card given

## 2018-04-02 NOTE — PATIENT INSTRUCTIONS
Pregnancy at 31 to 34 Weeks   AMBULATORY CARE:   What changes are happening to your body: You may continue to have symptoms such as shortness of breath, heartburn, contractions, or swelling of your ankles and feet  You may be gaining about 1 pound a week now  Seek care immediately if:   · You develop a severe headache that does not go away  · You have new or increased vision changes, such as blurred or spotted vision  · You have new or increased swelling in your face or hands  · You have vaginal spotting or bleeding  · Your water broke or you feel warm water gushing or trickling from your vagina  Contact your healthcare provider if:   · You have more than 5 contractions in 1 hour  · You notice any changes in your baby's movements  · You have abdominal cramps, pressure, or tightening  · You have a change in vaginal discharge  · You have chills or a fever  · You have vaginal itching, burning, or pain  · You have yellow, green, white, or foul-smelling vaginal discharge  · You have pain or burning when you urinate, less urine than usual, or pink or bloody urine  · You have questions or concerns about your condition or care  How to care for yourself at this stage of your pregnancy:   · Eat a variety of healthy foods  Healthy foods include fruits, vegetables, whole-grain breads, low-fat dairy foods, beans, lean meats, and fish  Drink liquids as directed  Ask how much liquid to drink each day and which liquids are best for you  Limit caffeine to less than 200 milligrams each day  Limit your intake of fish to 2 servings each week  Choose fish low in mercury such as canned light tuna, shrimp, salmon, cod, or tilapia  Do not  eat fish high in mercury such as swordfish, tilefish, ivan mackerel, and shark  · Manage heartburn  by eating 4 or 5 small meals each day instead of large meals  Avoid spicy food  · Manage swelling  by lying down and putting your feet up       · Take prenatal vitamins as directed  Your need for certain vitamins and minerals, such as folic acid, increases during pregnancy  Prenatal vitamins provide some of the extra vitamins and minerals you need  Prenatal vitamins may also help to decrease the risk of certain birth defects  · Talk to your healthcare provider about exercise  Moderate exercise can help you stay fit  Your healthcare provider will help you plan an exercise program that is safe for you during pregnancy  · Do not smoke  If you smoke, it is never too late to quit  Smoking increases your risk of a miscarriage and other health problems during your pregnancy  Smoking can cause your baby to be born too early or weigh less at birth  Ask your healthcare provider for information if you need help quitting  · Do not drink alcohol  Alcohol passes from your body to your baby through the placenta  It can affect your baby's brain development and cause fetal alcohol syndrome (FAS)  FAS is a group of conditions that causes mental, behavior, and growth problems  · Talk to your healthcare provider before you take any medicines  Many medicines may harm your baby if you take them when you are pregnant  Do not take any medicines, vitamins, herbs, or supplements without first talking to your healthcare provider  Never use illegal or street drugs (such as marijuana or cocaine) while you are pregnant  Safety tips during pregnancy:   · Avoid hot tubs and saunas  Do not use a hot tub or sauna while you are pregnant, especially during your first trimester  Hot tubs and saunas may raise your baby's temperature and increase the risk of birth defects  · Avoid toxoplasmosis  This is an infection caused by eating raw meat or being around infected cat feces  It can cause birth defects, miscarriages, and other problems  Wash your hands after you touch raw meat  Make sure any meat is well-cooked before you eat it  Avoid raw eggs and unpasteurized milk   Use gloves or ask someone else to clean your cat's litter box while you are pregnant  Changes that are happening with your baby:  By 34 weeks, your baby may weigh more than 5 pounds  Your baby will be about 12 ½ inches long from the top of the head to the rump (baby's bottom)  Your baby is gaining about ½ pound a week  Your baby's eyes open and close now  Your baby's kicks and movements are more forceful at this time  What you need to know about prenatal care: Your healthcare provider will check your blood pressure and weight  You may also need the following:  · A urine test  may also be done to check for sugar and protein  These can be signs of gestational diabetes or infection  Protein in your urine may also be a sign of preeclampsia  Preeclampsia is a condition that can develop during week 20 or later of your pregnancy  It causes high blood pressure, and it can cause problems with your kidneys and other organs  · A Tdap vaccine  may be recommended by your healthcare provider  · Fundal height  is a measurement of your uterus to check your baby's growth  This number is usually the same as the number of weeks that you have been pregnant  Your healthcare provider may also check your baby's position  · Your baby's heart rate  will be checked  © 2017 [address]  Information is for End User's use only and may not be sold, redistributed or otherwise used for commercial purposes  All illustrations and images included in CareNotes® are the copyrighted property of [employer] , Inc  or Brian Sanderson  The above information is an  only  It is not intended as medical advice for individual conditions or treatments  Talk to your doctor, nurse or pharmacist before following any medical regimen to see if it is safe and effective for you

## 2018-04-17 ENCOUNTER — ROUTINE PRENATAL (OUTPATIENT)
Dept: OBGYN CLINIC | Facility: CLINIC | Age: 33
End: 2018-04-17

## 2018-04-17 VITALS
DIASTOLIC BLOOD PRESSURE: 73 MMHG | BODY MASS INDEX: 27.17 KG/M2 | WEIGHT: 153.4 LBS | HEART RATE: 108 BPM | SYSTOLIC BLOOD PRESSURE: 109 MMHG

## 2018-04-17 DIAGNOSIS — Z3A.35 35 WEEKS GESTATION OF PREGNANCY: Primary | ICD-10-CM

## 2018-04-17 PROCEDURE — 99213 OFFICE O/P EST LOW 20 MIN: CPT | Performed by: FAMILY MEDICINE

## 2018-04-17 NOTE — PROGRESS NOTES
FAMILY MEDICINE PROGRESS NOTE  Sheri Mike Murphydonconi 29 y o  female   DATE: April 17, 2018 TIME: 9:17 AM  A/P  Delfina Perez is a 29 y o  female at 35w2d:  -Routine prenatal care  -GBS next visit  -RTC in one week    Delfina Perez is a 29 y o  female at 32w1d who presents today for a routine prenatal visit      Common complaints:  Nausea/Vomiting: No  Headaches: No  Abdominal pain: No  Peripheral swelling: No  Urinary symptoms: No  Vaginal bleeding: No  Contractions: No  Fetal Movement: Yes    OBJECTIVE:  /73 (BP Location: Left arm, Patient Position: Sitting, Cuff Size: Adult)   Pulse (!) 108   Wt 69 6 kg (153 lb 6 4 oz)   LMP 08/13/2017   BMI 27 17 kg/m²     Brittany Dailey MD

## 2018-04-24 ENCOUNTER — ROUTINE PRENATAL (OUTPATIENT)
Dept: OBGYN CLINIC | Facility: CLINIC | Age: 33
End: 2018-04-24

## 2018-04-24 VITALS
HEART RATE: 98 BPM | DIASTOLIC BLOOD PRESSURE: 75 MMHG | BODY MASS INDEX: 27.42 KG/M2 | SYSTOLIC BLOOD PRESSURE: 120 MMHG | WEIGHT: 154.8 LBS

## 2018-04-24 DIAGNOSIS — Z3A.36 36 WEEKS GESTATION OF PREGNANCY: Primary | ICD-10-CM

## 2018-04-24 DIAGNOSIS — O09.899 RUBELLA NON-IMMUNE STATUS, ANTEPARTUM: ICD-10-CM

## 2018-04-24 DIAGNOSIS — Z34.83 ENCOUNTER FOR SUPERVISION OF OTHER NORMAL PREGNANCY IN THIRD TRIMESTER: ICD-10-CM

## 2018-04-24 DIAGNOSIS — Z28.39 RUBELLA NON-IMMUNE STATUS, ANTEPARTUM: ICD-10-CM

## 2018-04-24 PROCEDURE — 87653 STREP B DNA AMP PROBE: CPT | Performed by: NURSE PRACTITIONER

## 2018-04-24 PROCEDURE — 99213 OFFICE O/P EST LOW 20 MIN: CPT | Performed by: NURSE PRACTITIONER

## 2018-04-24 NOTE — PROGRESS NOTES
[id number] used  Pt has received Tdap vaccine  Feels good Fm, denies any LOF, VB or Uc's  She has hospital tour scheduled on Saturday  Reviewed GBS culture  Hx of SGA with her last pregnancy, baby weighed 5 lbs    S<D, hx of SGA, will order [facility]  GBS culture done today  Reviewed precautions     RTO in 1 week

## 2018-04-26 LAB — GP B STREP DNA SPEC QL NAA+PROBE: NORMAL

## 2018-04-30 ENCOUNTER — ULTRASOUND (OUTPATIENT)
Dept: PERINATAL CARE | Facility: CLINIC | Age: 33
End: 2018-04-30

## 2018-04-30 VITALS
SYSTOLIC BLOOD PRESSURE: 108 MMHG | WEIGHT: 157.8 LBS | DIASTOLIC BLOOD PRESSURE: 86 MMHG | BODY MASS INDEX: 27.95 KG/M2 | HEART RATE: 90 BPM

## 2018-04-30 DIAGNOSIS — Z3A.36 36 WEEKS GESTATION OF PREGNANCY: ICD-10-CM

## 2018-04-30 DIAGNOSIS — Z3A.37 37 WEEKS GESTATION OF PREGNANCY: ICD-10-CM

## 2018-04-30 DIAGNOSIS — O36.5930 POOR FETAL GROWTH AFFECTING MANAGEMENT OF MOTHER IN THIRD TRIMESTER, SINGLE OR UNSPECIFIED FETUS: Primary | ICD-10-CM

## 2018-04-30 PROCEDURE — 76820 UMBILICAL ARTERY ECHO: CPT | Performed by: OBSTETRICS & GYNECOLOGY

## 2018-04-30 PROCEDURE — 59025 FETAL NON-STRESS TEST: CPT | Performed by: OBSTETRICS & GYNECOLOGY

## 2018-04-30 PROCEDURE — 76821 MIDDLE CEREBRAL ARTERY ECHO: CPT | Performed by: OBSTETRICS & GYNECOLOGY

## 2018-04-30 PROCEDURE — 99212 OFFICE O/P EST SF 10 MIN: CPT | Performed by: OBSTETRICS & GYNECOLOGY

## 2018-04-30 PROCEDURE — 76816 OB US FOLLOW-UP PER FETUS: CPT | Performed by: OBSTETRICS & GYNECOLOGY

## 2018-05-03 ENCOUNTER — ROUTINE PRENATAL (OUTPATIENT)
Dept: PERINATAL CARE | Facility: CLINIC | Age: 33
End: 2018-05-03

## 2018-05-03 VITALS
SYSTOLIC BLOOD PRESSURE: 120 MMHG | HEIGHT: 63 IN | WEIGHT: 158.4 LBS | HEART RATE: 79 BPM | DIASTOLIC BLOOD PRESSURE: 76 MMHG | BODY MASS INDEX: 28.07 KG/M2

## 2018-05-03 DIAGNOSIS — Z3A.37 37 WEEKS GESTATION OF PREGNANCY: ICD-10-CM

## 2018-05-03 DIAGNOSIS — O36.5930 INTRAUTERINE GROWTH RESTRICTION (IUGR) AFFECTING CARE OF MOTHER, THIRD TRIMESTER, SINGLE GESTATION: Primary | ICD-10-CM

## 2018-05-03 PROCEDURE — 59025 FETAL NON-STRESS TEST: CPT | Performed by: OBSTETRICS & GYNECOLOGY

## 2018-05-03 NOTE — PROGRESS NOTES
NST procedure and expected outcome explained to patient  Daily fetal kick count reviewed  Patient verbalized understanding of all and was receptive      Constanza Chin RN

## 2018-05-07 ENCOUNTER — ROUTINE PRENATAL (OUTPATIENT)
Dept: PERINATAL CARE | Facility: CLINIC | Age: 33
End: 2018-05-07

## 2018-05-07 VITALS
SYSTOLIC BLOOD PRESSURE: 115 MMHG | WEIGHT: 163.4 LBS | BODY MASS INDEX: 28.95 KG/M2 | HEART RATE: 72 BPM | HEIGHT: 63 IN | DIASTOLIC BLOOD PRESSURE: 74 MMHG

## 2018-05-07 DIAGNOSIS — O36.5930 INTRAUTERINE GROWTH RESTRICTION (IUGR) AFFECTING CARE OF MOTHER, THIRD TRIMESTER, SINGLE GESTATION: Primary | ICD-10-CM

## 2018-05-07 DIAGNOSIS — Z3A.38 38 WEEKS GESTATION OF PREGNANCY: ICD-10-CM

## 2018-05-07 PROCEDURE — 76815 OB US LIMITED FETUS(S): CPT | Performed by: OBSTETRICS & GYNECOLOGY

## 2018-05-07 PROCEDURE — 59025 FETAL NON-STRESS TEST: CPT | Performed by: OBSTETRICS & GYNECOLOGY

## 2018-05-07 PROCEDURE — 76821 MIDDLE CEREBRAL ARTERY ECHO: CPT | Performed by: OBSTETRICS & GYNECOLOGY

## 2018-05-07 PROCEDURE — 76820 UMBILICAL ARTERY ECHO: CPT | Performed by: OBSTETRICS & GYNECOLOGY

## 2018-05-08 NOTE — PROGRESS NOTES
NST is reactive and NEELIMA is normal and Dopplers are reassuring  Delivery recommendations are under her April 30th note by Dr Vanessa Bland MD

## 2018-05-10 ENCOUNTER — ROUTINE PRENATAL (OUTPATIENT)
Dept: PERINATAL CARE | Facility: CLINIC | Age: 33
End: 2018-05-10

## 2018-05-10 VITALS
WEIGHT: 156.6 LBS | SYSTOLIC BLOOD PRESSURE: 133 MMHG | DIASTOLIC BLOOD PRESSURE: 85 MMHG | HEIGHT: 63 IN | HEART RATE: 90 BPM | BODY MASS INDEX: 27.75 KG/M2

## 2018-05-10 DIAGNOSIS — O36.5930 INTRAUTERINE GROWTH RESTRICTION (IUGR) AFFECTING CARE OF MOTHER, THIRD TRIMESTER, SINGLE GESTATION: Primary | ICD-10-CM

## 2018-05-10 DIAGNOSIS — Z3A.38 38 WEEKS GESTATION OF PREGNANCY: ICD-10-CM

## 2018-05-10 PROCEDURE — 59025 FETAL NON-STRESS TEST: CPT | Performed by: OBSTETRICS & GYNECOLOGY

## 2018-05-10 NOTE — PROGRESS NOTES
NST procedure and expected outcome explained to patient  Daily fetal kick count reviewed  Patient verbalized understanding of all and was receptive  Chantale Calzada RN   I stressed the importance of calling her OB should she experience decreased fetal movements which she and her  verbalized understandings        [address] [facility]

## 2018-05-13 ENCOUNTER — HOSPITAL ENCOUNTER (INPATIENT)
Facility: HOSPITAL | Age: 33
LOS: 2 days | Discharge: HOME | DRG: 560 | End: 2018-05-15
Attending: OBSTETRICS & GYNECOLOGY | Admitting: OBSTETRICS & GYNECOLOGY
Payer: COMMERCIAL

## 2018-05-13 DIAGNOSIS — Z3A.39 39 WEEKS GESTATION OF PREGNANCY: Primary | ICD-10-CM

## 2018-05-13 LAB
ABO GROUP BLD: NORMAL
BLD GP AB SCN SERPL QL: NEGATIVE
RH BLD: POSITIVE
SPECIMEN EXPIRATION DATE: NORMAL

## 2018-05-13 PROCEDURE — 85025 COMPLETE CBC W/AUTO DIFF WBC: CPT | Performed by: OBSTETRICS & GYNECOLOGY

## 2018-05-13 PROCEDURE — 86901 BLOOD TYPING SEROLOGIC RH(D): CPT | Performed by: OBSTETRICS & GYNECOLOGY

## 2018-05-13 PROCEDURE — 86592 SYPHILIS TEST NON-TREP QUAL: CPT | Performed by: OBSTETRICS & GYNECOLOGY

## 2018-05-13 PROCEDURE — 80307 DRUG TEST PRSMV CHEM ANLYZR: CPT | Performed by: OBSTETRICS & GYNECOLOGY

## 2018-05-13 PROCEDURE — 85027 COMPLETE CBC AUTOMATED: CPT | Performed by: OBSTETRICS & GYNECOLOGY

## 2018-05-13 PROCEDURE — 86900 BLOOD TYPING SEROLOGIC ABO: CPT | Performed by: OBSTETRICS & GYNECOLOGY

## 2018-05-13 PROCEDURE — 86850 RBC ANTIBODY SCREEN: CPT | Performed by: OBSTETRICS & GYNECOLOGY

## 2018-05-13 PROCEDURE — 85007 BL SMEAR W/DIFF WBC COUNT: CPT | Performed by: OBSTETRICS & GYNECOLOGY

## 2018-05-13 RX ORDER — LIDOCAINE HYDROCHLORIDE 10 MG/ML
INJECTION, SOLUTION EPIDURAL; INFILTRATION; INTRACAUDAL; PERINEURAL
Status: DISCONTINUED
Start: 2018-05-13 | End: 2018-05-13 | Stop reason: WASHOUT

## 2018-05-14 PROBLEM — Z3A.39 39 WEEKS GESTATION OF PREGNANCY: Status: ACTIVE | Noted: 2018-04-24

## 2018-05-14 LAB
AMPHETAMINES SERPL QL SCN: NEGATIVE
BARBITURATES UR QL: NEGATIVE
BENZODIAZ UR QL: NEGATIVE
COCAINE UR QL: NEGATIVE
EOSINOPHIL NFR BLD MANUAL: 1 % (ref 0–6)
ERYTHROCYTE [DISTWIDTH] IN BLOOD BY AUTOMATED COUNT: 15.3 % (ref 11.6–15.1)
HCT VFR BLD AUTO: 41.2 % (ref 34.8–46.1)
HGB BLD-MCNC: 12.8 G/DL (ref 11.5–15.4)
LYMPHOCYTES # BLD AUTO: 38 % (ref 14–44)
MCH RBC QN AUTO: 26.4 PG (ref 26.8–34.3)
MCHC RBC AUTO-ENTMCNC: 31.1 G/DL (ref 31.4–37.4)
MCV RBC AUTO: 85 FL (ref 82–98)
METHADONE UR QL: NEGATIVE
MONOCYTES NFR BLD: 8 % (ref 4–12)
NEUTS SEG NFR BLD AUTO: 43 % (ref 43–75)
OPIATES UR QL SCN: NEGATIVE
PATHOLOGIST INTERPRETATION: NORMAL
PATHOLOGY REVIEW: YES
PCP UR QL: NEGATIVE
PLATELET # BLD AUTO: 297 THOUSANDS/UL (ref 149–390)
PLATELET BLD QL SMEAR: ADEQUATE
PMV BLD AUTO: 10.6 FL (ref 8.9–12.7)
RBC # BLD AUTO: 4.85 MILLION/UL (ref 3.81–5.12)
RPR SER QL: NORMAL
THC UR QL: NEGATIVE
TOTAL CELLS COUNTED SPEC: 100
VARIANT LYMPHS # BLD AUTO: 10 %
WBC # BLD AUTO: 11.31 THOUSAND/UL (ref 4.31–10.16)

## 2018-05-14 PROCEDURE — 88307 TISSUE EXAM BY PATHOLOGIST: CPT | Performed by: PATHOLOGY

## 2018-05-14 RX ORDER — OXYCODONE HYDROCHLORIDE AND ACETAMINOPHEN 5; 325 MG/1; MG/1
2 TABLET ORAL EVERY 4 HOURS PRN
Status: DISCONTINUED | OUTPATIENT
Start: 2018-05-14 | End: 2018-05-15 | Stop reason: HOSPADM

## 2018-05-14 RX ORDER — ACETAMINOPHEN 325 MG/1
650 TABLET ORAL EVERY 6 HOURS PRN
Status: DISCONTINUED | OUTPATIENT
Start: 2018-05-14 | End: 2018-05-15 | Stop reason: HOSPADM

## 2018-05-14 RX ORDER — OXYCODONE HYDROCHLORIDE AND ACETAMINOPHEN 5; 325 MG/1; MG/1
1 TABLET ORAL EVERY 4 HOURS PRN
Status: DISCONTINUED | OUTPATIENT
Start: 2018-05-14 | End: 2018-05-15 | Stop reason: HOSPADM

## 2018-05-14 RX ORDER — OXYTOCIN/RINGER'S LACTATE 30/500 ML
250 PLASTIC BAG, INJECTION (ML) INTRAVENOUS CONTINUOUS
Status: ACTIVE | OUTPATIENT
Start: 2018-05-14 | End: 2018-05-14

## 2018-05-14 RX ORDER — SIMETHICONE 80 MG
80 TABLET,CHEWABLE ORAL 4 TIMES DAILY PRN
Status: DISCONTINUED | OUTPATIENT
Start: 2018-05-14 | End: 2018-05-15 | Stop reason: HOSPADM

## 2018-05-14 RX ORDER — MEDROXYPROGESTERONE ACETATE 150 MG/ML
150 INJECTION, SUSPENSION INTRAMUSCULAR ONCE
Status: COMPLETED | OUTPATIENT
Start: 2018-05-14 | End: 2018-05-15

## 2018-05-14 RX ORDER — ONDANSETRON 2 MG/ML
4 INJECTION INTRAMUSCULAR; INTRAVENOUS EVERY 8 HOURS PRN
Status: DISCONTINUED | OUTPATIENT
Start: 2018-05-14 | End: 2018-05-15 | Stop reason: HOSPADM

## 2018-05-14 RX ORDER — DOCUSATE SODIUM 100 MG/1
100 CAPSULE, LIQUID FILLED ORAL 2 TIMES DAILY
Status: DISCONTINUED | OUTPATIENT
Start: 2018-05-14 | End: 2018-05-15 | Stop reason: HOSPADM

## 2018-05-14 RX ORDER — DIAPER,BRIEF,INFANT-TODD,DISP
1 EACH MISCELLANEOUS AS NEEDED
Status: DISCONTINUED | OUTPATIENT
Start: 2018-05-14 | End: 2018-05-15 | Stop reason: HOSPADM

## 2018-05-14 RX ORDER — CALCIUM CARBONATE 200(500)MG
1000 TABLET,CHEWABLE ORAL DAILY PRN
Status: DISCONTINUED | OUTPATIENT
Start: 2018-05-14 | End: 2018-05-15 | Stop reason: HOSPADM

## 2018-05-14 RX ORDER — DIPHENHYDRAMINE HCL 25 MG
25 TABLET ORAL EVERY 6 HOURS PRN
Status: DISCONTINUED | OUTPATIENT
Start: 2018-05-14 | End: 2018-05-15 | Stop reason: HOSPADM

## 2018-05-14 RX ORDER — IBUPROFEN 600 MG/1
600 TABLET ORAL EVERY 6 HOURS PRN
Status: DISCONTINUED | OUTPATIENT
Start: 2018-05-14 | End: 2018-05-15 | Stop reason: HOSPADM

## 2018-05-14 RX ADMIN — Medication 1 TABLET: at 09:45

## 2018-05-14 RX ADMIN — DOCUSATE SODIUM 100 MG: 100 CAPSULE, LIQUID FILLED ORAL at 17:05

## 2018-05-14 RX ADMIN — DOCUSATE SODIUM 100 MG: 100 CAPSULE, LIQUID FILLED ORAL at 09:45

## 2018-05-14 NOTE — NURSING NOTE
Pt was transferred from the ED to the labor and delivery floor after delivering her baby in the bathroom in the ED  Nursing spoke with patient via the blue translation phone to gain information about the delivery and to ask admission questions  Plan of care was discussed with patient, patient agrees and has no questions at this time  Will continue to monitor  Transferring patient from labor to post partum

## 2018-05-14 NOTE — SOCIAL WORK
Consults for no insurance, poor pnc, and only 3 prenatal visits  Per chart, pt has MATTHIAS SOSA pending insurance status and is working with [employer] for insurance application for her and baby  Pt and baby UDS negative  Pt speaks Estonian only so language line interpretor Brandon Andrea #[id number] was used for communication  Met with pt ([phone]) to introduce CM services and complete interview  Pt reports she is doing ok and baby boy Violette Montaño is 4th kid for her with FOB/SO Maureen Baileyuro ([phone]) who is involved and supportive  Pt reports she and FOB live together with their kids ages 15, 6, and 10 yrs in [place] apt ([address])  Pt reports they have lived in [place] for the last 2 yrs  Pt reports they do have family and friends for support locally, have all baby supplies needed, will bottle feed, has [address], have rental assistance as neither pt nor FOB currently work, and family has car for transportation as needed  Ped [address]  Pt denies any mental health issues, hx PPD, drug use, no POA, and denies any involvement with VNA or C&Y  Pt reports she had poor PNC because she does not understand English and would forget when appointments were and then would try to reschedule but had difficulty due to language barrier  MOB aware of need for follow up with ped for baby  CM reviewed d/c planning process including the following: identifying help at home, patient preference for d/c planning needs, Discharge Lounge, [facility] Meds to Bed program, availability of treatment team to discuss questions or concerns patient and/or family may have regarding understanding medications and recognizing signs and symptoms once discharged  CM also encouraged patient to follow up with all recommended appointments after discharge  Patient advised of importance for patient and family to participate in managing patients medical well being  Pt denies any CM needs at this time   No CM needs noted for d/c home

## 2018-05-14 NOTE — H&P
History  & Physical - OB/GYN   Humberto David Hand 29 y o  (1985) - MRN: [id number]  Unit/Bed#: -01 Encounter: [id number]    Assessment:   29 y o  [id number] at 39w1d weeks  Plan:   Standard Post-partum order set      SUBJECTIVE:    HPI:   Please see delivery note for further details  OBJECTIVE:    Patient Active Problem List   Diagnosis    Rubella non-immune status, antepartum    39 weeks gestation of pregnancy    Intrauterine growth restriction (IUGR) affecting care of mother, third trimester, single gestation   Mary Junior  (spontaneous vaginal delivery)       OB History    Para Term  AB Living   4 3 3 0 0 3   SAB TAB Ectopic Multiple Live Births   0 0 0 0 3      # Outcome Date GA Lbr Harish/2nd Weight Sex Delivery Anes PTL Lv   4 Current            3 Term 12 40w0d  2722 g (6 lb) M Vag-Spont None  IBRAHIMA   2 Term 09 40w0d  3175 g (7 lb) F Vag-Spont None  IBRAHIMA   1 Term 06 40w0d  3175 g (7 lb) M Vag-Spont None  IBRAHIMA          Past Medical History:   Diagnosis Date    Varicella     imm       History reviewed  No pertinent surgical history  No current facility-administered medications on file prior to encounter  Current Outpatient Prescriptions on File Prior to Encounter   Medication Sig Dispense Refill    Prenatal Vit-Fe Fumarate-FA ([facility]) 28-0 8 MG TABS Take 1 tablet by mouth daily for 90 days 90 each 1       No Known Allergies    Social History     Social History    Marital status: Single     Spouse name: Juan C Estevez    Number of children: N/A    Years of education: no High School     Occupational History          Social History Main Topics    Smoking status: Never Smoker    Smokeless tobacco: Never Used    Alcohol use No    Drug use: No    Sexual activity: Yes     Partners: Male     Other Topics Concern    Not on file     Social History Narrative    No narrative on file       Labs:     Infectious:  Strep Grp B PCR   Date Value Ref Range Status   04/24/2018 Negative for Beta Hemolytic Strep Grp B by PCR  Final     RPR   Date Value Ref Range Status   02/26/2018 Non-Reactive Non-Reactive Final     Hepatitis B Surface Ag   Date Value Ref Range Status   11/20/2017 Non-reactive Non-reactive, NonReactive - Confirmed Final     Rubella IgG Quant   Date Value Ref Range Status   11/20/2017 2 1 (L) >9 9 IU/mL Final     HIV-1/HIV-2 Ab   Date Value Ref Range Status   11/20/2017 Non-Reactive Non-Reactive Final     N gonorrhoeae, DNA Probe   Date Value Ref Range Status   12/11/2017 N  gonorrhoeae Amplified DNA Negative N  gonorrhoeae Amplified DNA Negative Final     Chlamydia, DNA Probe   Date Value Ref Range Status   12/11/2017 C  trachomatis Amplified DNA Negative C  trachomatis Amplified DNA Negative Final       Type & Screen:  ABO Grouping   Date Value Ref Range Status   05/13/2018 O  Final     Rh Factor   Date Value Ref Range Status   05/13/2018 Positive  Final     Antibody Screen   Date Value Ref Range Status   05/13/2018 Negative  Final     Specimen Expiration Date   Date Value Ref Range Status   05/13/2018 [id number]  Final       Endocrine:  50 gram 1 hour Glucola:   Glucose   Date Value Ref Range Status   02/26/2018 92 See Comment mg/dL Final     Comment:       Specimen collection should occur prior to Sulfasalazine administration due to the potential for falsely depressed results  Specimen collection should occur prior to Sulfapyridine administration due to the potential for falsely elevated results  Specimen collection should occur prior to Sulfasalazine administration due to the potential for falsely depressed results  Specimen collection should occur prior to Sulfapyridine administration due to the potential for falsely elevated results         Results from last 7 days  Lab Units 05/13/18  2201   WBC Thousand/uL 11 31*   HEMOGLOBIN g/dL 12 8   MCV fL 85   PLATELETS Thousands/uL 297             Vaccines:  Immunization History   Administered Date(s) Administered    Influenza Quadrivalent Preservative Free 3 years and older IM 12/06/2017    Tdap 02/22/2018       Physical Exam:  Vital signs:  Vitals:    05/13/18 2230 05/13/18 2240 05/13/18 2245 05/13/18 2352   BP: 156/76  143/74 127/64   BP Location:    Right arm   Pulse: 67 56  58   Resp:    18   Temp:    97 9 °F (36 6 °C)   TempSrc:    Oral   Weight:  70 8 kg (156 lb)     Height:  5' 3" (1 6 m)       Physical Exam:  Heart: RRR  Lungs: CTA b/l  Abdomen: gravid

## 2018-05-15 VITALS
OXYGEN SATURATION: 98 % | DIASTOLIC BLOOD PRESSURE: 77 MMHG | WEIGHT: 156 LBS | RESPIRATION RATE: 20 BRPM | TEMPERATURE: 98.1 F | BODY MASS INDEX: 27.64 KG/M2 | HEIGHT: 63 IN | SYSTOLIC BLOOD PRESSURE: 120 MMHG | HEART RATE: 78 BPM

## 2018-05-15 PROCEDURE — 99232 SBSQ HOSP IP/OBS MODERATE 35: CPT | Performed by: OBSTETRICS & GYNECOLOGY

## 2018-05-15 PROCEDURE — 90707 MMR VACCINE SC: CPT | Performed by: OBSTETRICS & GYNECOLOGY

## 2018-05-15 RX ORDER — IBUPROFEN 600 MG/1
600 TABLET ORAL EVERY 6 HOURS PRN
Qty: 30 TABLET | Refills: 0
Start: 2018-05-15

## 2018-05-15 RX ORDER — ACETAMINOPHEN 325 MG/1
325 TABLET ORAL EVERY 4 HOURS PRN
Qty: 30 TABLET | Refills: 0
Start: 2018-05-15

## 2018-05-15 RX ORDER — DOCUSATE SODIUM 100 MG/1
100 CAPSULE, LIQUID FILLED ORAL 2 TIMES DAILY
Qty: 10 CAPSULE | Refills: 0
Start: 2018-05-15

## 2018-05-15 RX ORDER — DIAPER,BRIEF,INFANT-TODD,DISP
1 EACH MISCELLANEOUS 4 TIMES DAILY PRN
Qty: 30 G | Refills: 0
Start: 2018-05-15

## 2018-05-15 RX ADMIN — DOCUSATE SODIUM 100 MG: 100 CAPSULE, LIQUID FILLED ORAL at 11:18

## 2018-05-15 RX ADMIN — MEDROXYPROGESTERONE ACETATE 150 MG: 150 INJECTION, SUSPENSION, EXTENDED RELEASE INTRAMUSCULAR at 06:41

## 2018-05-15 RX ADMIN — MEASLES, MUMPS, AND RUBELLA VIRUS VACCINE LIVE 0.5 ML: 1000; 12500; 1000 INJECTION, POWDER, LYOPHILIZED, FOR SUSPENSION SUBCUTANEOUS at 11:16

## 2018-05-15 NOTE — DISCHARGE SUMMARY
Discharge Summary - Vito Vazquez 29 y o  female MRN: [id number]    Unit/Bed#: -01 Encounter: [id number]    Admission Date: 2018     Discharge Date: 05/15/18    Admitting Diagnosis:   1  Pregnancy at 39w0d  2  Poor prenatal care  3  Single fetus    Discharge Diagnosis: same, delivered    Procedures: spontaneous vaginal delivery    Attending: Mila Means MD    Hospital Course:     Vito Vazquez is a 29 y o  [id number] at 39w0d wks who initially arrived at the ED registration desk  However, she need to used the bathroom, and delivered  and placenta there  Patient and  were both transferred to L&D bed  Bilateral labial lacerations were noted, but not repaired  She delivered a viable male  on 2018 at 1943  Weight 6lbs 9 1oz via spontaneous vaginal delivery  Apgars were n/a (1 min) and 9 (5 min)   was transferred to  nursery  Patient tolerated the procedure well and was transferred to recovery in stable condition  Her postpartum course was uncomplicated  Her postpartum pain was well controlled with oral analgesics  Case Management also evaluated patient for any potential needs  On day of discharge, she was ambulating and able to reasonably perform all ADLs  She was voiding and had appropriate bowel function  Pain was well controlled  She was discharged home on postpartum day #2 without complications  Patient was instructed to follow up with her OB as an outpatient and was given appropriate warnings to call provider if she develops signs of infection or uncontrolled pain  Complications: none apparent    Condition at discharge: stable     Discharge instructions/Information to patient and family:   See after visit summary for information provided to patient and family  Provisions for Follow-Up Care:  See after visit summary for information related to follow-up care and any pertinent home health orders        Disposition: Home    Planned Readmission: No    Elroy Brownlee MD  5/15/2018  7:46 AM

## 2018-05-15 NOTE — LACTATION NOTE
This note was copied from a baby's chart  Discussed paced bottle feeding with Mom and Dad  Handout given

## 2018-05-15 NOTE — PROGRESS NOTES
Progress Note - OB/GYN   Kaovn Lindo 29 y o  female MRN: [id number]  Unit/Bed#: -01 Encounter: [id number]    Assessment:  Post partum Day #2 s/p , stable    Plan:  Continue routine post partum care  Encourage ambulation  Encourage breastfeeding  CM consult: completed; no other need at this time  Contraception: will receive depo today  Anticipate discharge today    Subjective/Objective   Chief Complaint:     Post delivery    Subjective:     Pain: yes, cramping, improved with meds  Tolerating PO: yes  Voiding: yes  Flatus: yes  BM: yes  Ambulating: yes  Breastfeeding:  no  Chest pain: no  Shortness of breath: no  Leg pain: no  Lochia: minimal    Objective:     Vitals: /71 (BP Location: Right arm)   Pulse 56   Temp 97 6 °F (36 4 °C) (Oral)   Resp 20   Ht 5' 3" (1 6 m)   Wt 70 8 kg (156 lb)   LMP 2017   SpO2 98%   Breastfeeding?  No   BMI 27 63 kg/m²     No intake or output data in the 24 hours ending 05/15/18 0646    Lab Results   Component Value Date    WBC 11 31 (H) 2018    HGB 12 8 2018    HCT 41 2 2018    MCV 85 2018     2018       Current Facility-Administered Medications   Medication Dose Route Frequency    acetaminophen (TYLENOL) tablet 650 mg  650 mg Oral Q6H PRN    benzocaine-menthol-lanolin-aloe (DERMOPLAST) 20-0 5 % topical spray   Topical 4x Daily PRN    calcium carbonate (TUMS) chewable tablet 1,000 mg  1,000 mg Oral Daily PRN    diphenhydrAMINE (BENADRYL) tablet 25 mg  25 mg Oral Q6H PRN    docusate sodium (COLACE) capsule 100 mg  100 mg Oral BID    hydrocortisone 1 % cream 1 application  1 application Topical PRN    ibuprofen (MOTRIN) tablet 600 mg  600 mg Oral Q6H PRN    ondansetron (ZOFRAN) injection 4 mg  4 mg Intravenous Q8H PRN    oxyCODONE-acetaminophen (PERCOCET) 5-325 mg per tablet 1 tablet  1 tablet Oral Q4H PRN    oxyCODONE-acetaminophen (PERCOCET) 5-325 mg per tablet 2 tablet  2 tablet Oral Q4H PRN    prenatal multivitamin tablet 1 tablet  1 tablet Oral Daily    simethicone (MYLICON) chewable tablet 80 mg  80 mg Oral 4x Daily PRN    witch hazel-glycerin (TUCKS) topical pad 1 pad  1 pad Topical PRN       Physical Exam:     Gen: AAOx3, NAD  CV: RRR  Lungs: CTA b/l  Abd: Soft, non-tender, non-distended, no rebound or guarding  Uterine fundus firm and non-tender, 2 cm below umbilicus  Ext: Non tender    Shari Cabezas, PGY-1  OB/GYN  5/15/2018  6:46 AM

## 2018-05-15 NOTE — LACTATION NOTE
This note was copied from a baby's chart  Mom requested to BF infant over night  States she will be BF and bottle feeding at home  Paced bottle feeding (Sp) and [address] handouts given  Dad at bedside to translate for Mom  Mom reports no needs or concerns at this time

## 2018-07-25 ENCOUNTER — PATIENT OUTREACH (OUTPATIENT)
Dept: OBGYN CLINIC | Facility: CLINIC | Age: 33
End: 2018-07-25

## 2018-07-25 NOTE — PROGRESS NOTES
LIZBET discussed completion of Emergency Medical Condition Verification to be returned to [facility] to cover pt's admission of 5/13-5/15/18  Form was completed and signed by provider Feliz Paiz and LIZBET faxed same to [facility]

## 2021-08-27 NOTE — PROGRESS NOTES
Medicare Wellness Visit  Plan for Preventive Care    A good way for you to stay healthy is to use preventive care.  Medicare covers many services that can help you stay healthy.* The goal of these services is to find any health problems as quickly as possible. Finding problems early can help make them easier to treat.  Your personal plan below lists the services you may need and when they are due.     Health Maintenance Summary     Diabetes Eye Exam (Yearly)  Overdue - never done    DM/CKD Microalbumin (Yearly)  Overdue - never done    Diabetes Foot Exam (Yearly)  Overdue - never done    DTaP/Tdap/Td Vaccine (1 - Tdap)  Overdue - never done    Shingles Vaccine (1 of 2)  Overdue - never done    Medicare Wellness Visit (Yearly)  Overdue since 12/16/2020    Depression Screening (Yearly)  Overdue since 12/16/2020    Diabetes A1C (Every 6 Months)  Ordered on 8/23/2021    DM/CKD GFR (Yearly)  Ordered on 8/23/2021    Influenza Vaccine (1)  Next due on 9/1/2021    Pneumococcal Vaccine 65+   Completed    COVID-19 Vaccine   Completed    Hepatitis B Vaccine   Aged Out    Meningococcal Vaccine   Aged Out    HPV Vaccine   Aged Out           Preventive Care for Women and Men    Heart Screenings (Cardiovascular):  · Blood tests are used to check your cholesterol, lipid and triglyceride levels. High levels can increase your risk for heart disease and stroke. High levels can be treated with medications, diet and exercise. Lowering your levels can help keep your heart and blood vessels healthy.  Your provider will order these tests if they are needed.    · An ultrasound is done to see if you have an abdominal aortic aneurysm (AAA).  This is an enlargement of one of the main blood vessels that delivers blood to the body.   In the [place], 9,000 deaths are caused by AAA.  You may not even know you have this problem and as many as 1 in 3 people will have a serious problem if it is not treated.  Early diagnosis allows for more  [id number] at 24w2d at office for routine prenatal visit  -no acute concerns this visit  -continue routine prenatal care  -will continue to monitor vulvar nodules, continue warm soaks  -orders for CBC, RPR, 50g glucola given  -follow up in 4 weeks  Of Note: epic ambulatory does not show episode of pregnancy as active, contacted help desk, a ticket was generated for the issue and was assured it would be fixed effective treatment and cure.  If you have a family history of AAA or are a male age 65-75 who has smoked, you are at higher risk of an AAA.  Your provider can order this test, if needed.    Colorectal Screening:  · There are many tests that are used to check for cancer of your colon and rectum. You and your provider should discuss what test is best for you and when to have it done.  Options include:  · Screening Colonoscopy: exam of the entire colon, seen through a flexible lighted tube.  · Flexible Sigmoidoscopy: exam of the last third (sigmoid portion) of the colon and rectum, seen through a flexible lighted tube.  · Cologuard DNA stool test: a sample of your stool is used to screen for cancer and unseen blood in your stool.  · Fecal Occult Blood Test: a sample of your stool is studied to find any unseen blood    Flu Shot:  · An immunization that helps to prevent influenza (the flu). You should get this every year. The best time to get the shot is in the fall.    Pneumococcal Shot:  • Vaccines are available that can help prevent pneumococcal disease, which is any type of infection caused by Streptococcus pneumoniae bacteria.   Their use can prevent some cases of pneumonia, meningitis, and sepsis. There are two types of pneumococcal vaccines:   o Conjugate vaccines (PCV-13 or Prevnar 13®) - helps protect against the 13 types of pneumococcal bacteria that are the most common causes of serious infections in children and adults.    o Polysaccharide vaccine (PPSV23 or Rnkppyojx82®) - helps protect against 23 types of pneumococcal bacteria for patients who are recommended to get it.  These vaccines should be given at least 12 months apart.  A booster is usually not needed.     Hepatitis B Shot:  · An immunization that helps to protect people from getting Hepatitis B. Hepatitis B is a virus that spreads through contact with infected blood or body fluids. Many people with the virus do not have symptoms.  The virus can  lead to serious problems, such as liver disease. Some people are at higher risk than others. Your doctor will tell you if you need this shot.     Diabetes Screening:  · A test to measure sugar (glucose) in your blood is called a fasting blood sugar. Fasting means you cannot have food or drink for at least 8 hours before the test. This test can detect diabetes long before you may notice symptoms.    Glaucoma Screening:  · Glaucoma screening is performed by your eye doctor. The test measures the fluid pressure inside your eyes to determine if you have glaucoma.     Hepatitis C Screening:  · A blood test to see if you have the hepatitis C virus.  Hepatitis C attacks the liver and is a major cause of chronic liver disease.  Medicare will cover a single screening for all adults born between 1945 & 1965, or high risk patients (people who have injected illegal drugs or people who have had blood transfusions).  High risk patients who continue to inject illegal drugs can be screened for Hepatitis C every year.    Smoking and Tobacco-Use Cessation Counseling:  · Tobacco is the single greatest cause of disease and early death in our country today. Medication and counseling together can increase a person’s chance of quitting for good.   · Medicare covers two quitting attempts per year, with four counseling sessions per attempt (eight sessions in a 12 month period)    Preventive Screening tests for Women    Screening Mammograms and Breast Exams:  · An x-ray of your breasts to check for breast cancer before you or your doctor may be able to feel it.  If breast cancer is found early it can usually be treated with success.    Pelvic Exams and Pap Tests:  · An exam to check for cervical and vaginal cancer. A Pap test is a lab test in which cells are taken from your cervix and sent to the lab to look for signs of cervical cancer. If cancer of the cervix is found early, chances for a cure are good. Testing can generally end at age 65,  or if a woman has a hysterectomy for a benign condition. Your provider may recommend more frequent testing if certain abnormal results are found.    Bone Mass Measurements:  · A painless x-ray of your bone density to see if you are at risk for a broken bone. Bone density refers to the thickness of bones or how tightly the bone tissue is packed.    Preventive Screening tests for Men    Prostate Screening:  · Should you have a prostate cancer test (PSA)?  It is up to you to decide if you want a prostate cancer test. Talk to your clinician to find out if the test is right for you.  Things for you to consider and talk about should include:  · Benefits and harms of the test  · Your family history  · How your race/ethnicity may influence the test  · If the test may impact other medical conditions you have  · Your values on screenings and treatments    *Medicare pays for many preventive services to keep you healthy. For some of these services, you might have to pay a deductible, coinsurance, and / or copayment.  The amounts vary depending on the type of services you need and the kind of Medicare health plan you have.    For further details on screenings offered by Medicare please visit: https://www.medicare.gov/coverage/preventive-screening-services

## 2022-09-10 NOTE — PROGRESS NOTES
POSTPARTUM NOTE  Ana Luisa Hurtado 29 y o  female MRN: [id number]  Unit/Bed#: -01 Encounter: [id number]    Date: 05/14/18  Procedure: Spontaneous Vaginal Delivery  Postpartum/Postop Day #: 1     SUBJECTIVE:  Pain: no  Tolerating Oral Intake: yes   Voiding: yes  Flatus: yes  Bowel Movement: yes  Ambulating: yes  Breastfeeding: no  Chest Pain: no  Shortness of Breath: no  Leg Pain/Discomfort: no  Lochia: moderate    OBJECTIVE:   Vitals: Temp:  [97 9 °F (36 6 °C)-98 3 °F (36 8 °C)] 98 3 °F (36 8 °C)  HR:  [55-78] 55  Resp:  [18] 18  BP: (127-156)/() 147/82  General: No Acute Distress   Cardiovascular: Regular, Rate and Rhythm, no murmur, normal S1/S2   Lungs: Clear to Auscultation Bilaterally, no wheezing, non-labored breathing   Abdomen: Soft, non-distended, non-tender, no rebound, guarding or CVA tenderness   Fundus: Firm & Non-Tender, Fundal Location:   -1 cm below the umbilicus  Lower Extremities: Non-tender, Edema Minimal      LABS / TESTS / MEDICATION:    Recent Results (from the past 72 hour(s))   CBC and differential    Collection Time: 05/13/18 10:01 PM   Result Value Ref Range    WBC 11 31 (H) 4 31 - 10 16 Thousand/uL    RBC 4 85 3 81 - 5 12 Million/uL    Hemoglobin 12 8 11 5 - 15 4 g/dL    Hematocrit 41 2 34 8 - 46 1 %    MCV 85 82 - 98 fL    MCH 26 4 (L) 26 8 - 34 3 pg    MCHC 31 1 (L) 31 4 - 37 4 g/dL    RDW 15 3 (H) 11 6 - 15 1 %    MPV 10 6 8 9 - 12 7 fL    Platelets 893 113 - 789 Thousands/uL   Type and screen    Collection Time: 05/13/18 10:01 PM   Result Value Ref Range    ABO Grouping O     Rh Factor Positive     Antibody Screen Negative     Specimen Expiration Date [id number]    Rapid drug screen, urine    Collection Time: 05/13/18 11:57 PM   Result Value Ref Range    Amph/Meth UR Negative Negative    Barbiturate Ur Negative Negative    Benzodiazepine Urine Negative Negative    Cocaine Urine Negative Negative    Methadone Urine Negative Negative    Opiate Urine Negative Negative PCP Ur Negative Negative    THC Urine Negative Negative         docusate sodium 100 mg Oral BID   medroxyPROGESTERone 150 mg Intramuscular Once   prenatal multivitamin 1 tablet Oral Daily       acetaminophen 650 mg Q6H PRN   benzocaine-menthol-lanolin-aloe  4x Daily PRN   calcium carbonate 1,000 mg Daily PRN   diphenhydrAMINE 25 mg Q6H PRN   hydrocortisone 1 application PRN   ibuprofen 600 mg Q6H PRN   ondansetron 4 mg Q8H PRN   oxyCODONE-acetaminophen 1 tablet Q4H PRN   oxyCODONE-acetaminophen 2 tablet Q4H PRN   simethicone 80 mg 4x Daily PRN   witch hazel-glycerin 1 pad PRN       ASSESSMENT:   29 y o  [id number] s/p Spontaneous Vaginal Delivery Postpartum day  1   # [id number] used to translate     PLAN:  1) Delivery: Continue routine postpartum care, encourage ambulation, advance diet as tolerated  2) Depo-Provera ordered for contraception  3) Follow up Case management consult for poor prenatal care     Signature / Title: Yvette Hamm DO, Family Medicine  Date: 5/14/2018  Time: 6:35 AM vasovagal syncope   pt does not want to wait full evaluation  currently asymptomatic

## 2023-12-08 NOTE — PATIENT INSTRUCTIONS
El embarazo de la semana 35 a la 38   CUIDADO AMBULATORIO:   Qué cambios están ocurriendo en green cuerpo: Al principio de las 37 semanas se considera que usted está en término completo  Podría ser mas fácil que usted respire si green bebé se ha posicionado con la michael hacia abajo  Es posible que usted necesite orinar con mayor frecuencia ya que el bebé podría estar presionando green vejiga  Usted también podría sentir más molestias y cansarse fácilmente  Busque atención médica de inmediato si:   · Usted presenta un madie dolor de michael que no desaparece  · Usted tiene cambios en la visión nuevos o en aumento, stefanie visión borrosa o con manchas  · Usted tiene inflamación nueva o creciente en green max o vickie  · Usted tiene manchado o sangrado vaginal     · Usted rompe tristin o siente un chorro o gotas de agua tibia que le está bajando por green vagina  Pregúntele a green Abbott Foyer vitaminas y minerales son adecuados para usted  · Usted tiene más de 5 contracciones en 1 hora  · Usted nota algún cambio en los movimientos de green bebé  · Usted tiene calambres, presión o tensión abdominal     · Usted tiene un cambio en la secreción vaginal     · Usted tiene escalofríos o fiebre  · Usted tiene comezón, ardor o dolor vaginal      · Usted tiene adam secreción vaginal amarillenta, verdosa, violeta o de [employer]  · Usted tiene dolor o ardor al Emma Liming, orina menos de lo habitual o tiene [place] rosada o sanguinolenta  · Usted tiene preguntas o inquietudes acerca de green condición o cuidado  Cómo cuidarse en esta etapa de green embarazo:   · Consuma alimentos saludables y variados  Alimentos saludables incluyen frutas, verduras, panes de mike integral, alimentos lácteos bajos en grasa, frijoles, sultana magras y pescado  [place] líquidos stefanie se le haya indicado  Pregunte cuánto líquido debe rafa cada día y cuáles líquidos son los más adecuados para usted   Limite el consumo de cafeína a menos de 200 miligramos cada día  Limite el consumo de pescado a 2 porciones cada semana  Escoja pescado con concentraciones bajas de mis stefanie atún ligero enlatado, camarón, cangrejo, salmón, bacalao o tilapia  No  coma pescado con concentraciones altas de mis stefanie pez peggy, caballa gigante, pargo rayado y tiburón  · [id number] [place]  Green necesidad de ciertas vitaminas y [address], stefanie el ácido fólico, aumenta bright el [facility]  Las vitaminas prenatales proporcionan algunas de las vitaminas y minerales adicionales que usted necesita  Las vitaminas prenatales también podrían ayudar a disminuir el riesgo de ciertos defectos de nacimiento  · Descanse tanto stefanie sea necesario  Levante xochilt pies si tiene inflamación en xochilt tobillos y pies  · No fume  Si usted fuma, nunca es demasiado tarde para dejar de hacerlo  Fumar aumenta el riesgo de aborto espontáneo y otros problemas de anne bright green [facility]  Fumar puede causar que green bebé nazca antes de tiempo o que pese menos al nacer  Solicite información a green médico si usted necesita ayuda para dejar de fumar  · No consuma alcohol  El alcohol pasa de green cuerpo al bebé a través de la placenta  Puede afectar el desarrollo del cerebro de green bebé y provocar el síndrome de alcoholismo fetal (SAF)  FAS es un mahendra de condiciones que causan [address], de comportamiento y de crecimiento  · Consulte con green médico antes de rafa cualquier medicamento  Muchos medicamentos pueden perjudicar a green bebé si usted los paulo [address]  No tome ningún medicamento, vitaminas, hierbas o suplementos sin ricci consultar con green Benjamin Beech  use drogas ilegales o de la moraes (stefanie marihuana o cocaína) mientras está embarazada  · Hable con green médico antes de viajar  Es posible que no pueda viajar en avión después de las 36 11 [facility]  También le puede recomendar que evite largos viajes por carretera    Consejos de seguridad bright el embarazo:   · Evite jacuzzis y saunas  No use un jacuzzi o un sauna mientras usted está embarazada, especialmente bright el primer trimestre  Los [place] [facility] y los saunas aumentan la temperatura de iraheta bebé y el riesgo de defectos de nacimiento  · Evite la toxoplasmosis  [place] es adam infección causada por comer carne cruda o estar cerca del excremento de un darci infectado  [place] puede causar malformaciones congénitas, aborto espontáneo y Elieser Schein  Lávese las vickie después de tocar carne cruda  Asegúrese de que la carne esté drea cocida antes de comerla  Evite los huevos crudos y la Tripp Hager  Use guantes o pida que alguien la ayude a limpiar la caja de arena del darci mientras usted Brandin Post  · Consulte con iraheta médico acerca de viajar  El [address] cómodo para viajar es bright el jaxson trimestre  Pregunte a iraheta médico si usted puede viajar después de las 36 semanas  Es posible que no pueda viajar en avión después de las 36 [address] [place]  También le puede recomendar que evite largos viajes por carretera  Cambios que están ocurriendo con iraheta bebé:  Para las 38 semanas, iraheta bebé podría pesar entre 6 y 5 libras  Iraheta bebé podría medir alrededor de 14 pulgadas de epte desde la punta de la michael hasta la rabadilla (parte inferior del bebé)  Iraheta bebé escucha lo suficientemente drea stefanie para reconocer iraheta voz  Conforme iraheta bebé crece más, usted podría sentir menos patadas y sentir más que iraheta bebé se estira y [place]  Iraheta bebé podría moverse en posición con la michael hacia abajo  Iraheta bebé también descansará en la parte inferior de iraheta abdomen  Lo que necesita saber acerca del cuidado prenatal:  Iraheta médico le revisará iraheta presión arterial y [place]  Es posible que también necesite lo siguiente:  · Un examen de orina  también podría realizarse para revisarle el azúcar y la proteína  Estas son señales de diabetes gestacional o de infección   La proteína en iraheta orina también podría ser Rola bandaal de preeclampsia  La preeclampsia es adam condición que puede desarrollarse bright la semana 21 o después en iraheta embarazo  Esta provoca presión arterial avelino y [employer] con xochilt riñones y [place]  · Los análisis de sarah  se pueden realizar para revisar si tiene signos de anemia (nivel bajo del aliya)  · La vacuna Tdap  podría ser recomendado por iraheta médico      · El examen del estreptococo del mahendra B  es un examen que se realiza para verificar si hay infección por estreptococos del mahendra B  El estreptococo del mahendra B es un tipo de bacteria que se puede encontrar en la vagina o el recto  Podría ser transmitida a iraheta bebé bright el parto si usted la tiene  Iraheta médico podría rafa Pearlean Emperor de iraheta vagina o recto y blank la Dargabriella Arellano al laboratorio para que la examinen  · La altura uterina  es adam medición del útero para controlar el desarrollo de iraheta bebé  [employer] por lo general es igual al número de [facility] que usted tiene de embarazo  Iraheta médico también podría revisar la posición de iraheta bebé  · El ritmo cardíaco de iraheta bebé  será revisado  © 2017 [address]  Information is for End User's use only and may not be sold, redistributed or otherwise used for commercial purposes  All illustrations and images included in CareNotes® are the copyrighted property of A D A M , Inc  or Brian Sanderson  Esta información es sólo para uso en educación  Iraheta intención no es darle un consejo médico sobre enfermedades o tratamientos  Colsulte con iraheta Tina Parnell farmacéutico antes de seguir cualquier régimen médico para saber si es seguro y efectivo para usted  El embarazo de la semana 35 a la 38   CUIDADO AMBULATORIO:   Qué cambios están ocurriendo en iraheta cuerpo: Al principio de las 37 semanas se considera que usted está en término completo  Podría ser mas fácil que usted respire si iraheta bebé se ha posicionado con la michael hacia abajo   Es posible que usted necesite orinar con mayor frecuencia ya que el bebé podría estar presionando green vejiga  Usted también podría sentir más molestias y cansarse fácilmente  Busque atención médica de inmediato si:   · Usted presenta un madie dolor de michael que no desaparece  · Usted tiene cambios en la visión nuevos o en aumento, stefanie visión borrosa o con manchas  · Usted tiene inflamación nueva o creciente en green max o vickie  · Usted tiene manchado o sangrado vaginal     · Usted rompe tristin o siente un chorro o gotas de agua tibia que le está bajando por green vagina  Pregúntele a green Ally [place] vitaminas y minerales son adecuados para usted  · Usted tiene más de 5 contracciones en 1 hora  · Usted nota algún cambio en los movimientos de green bebé  · Usted tiene calambres, presión o tensión abdominal     · Usted tiene un cambio en la secreción vaginal     · Usted tiene escalofríos o fiebre  · Usted tiene comezón, ardor o dolor vaginal      · Usted tiene adam secreción vaginal amarillenta, verdosa, violeta o de [employer]  · Usted tiene dolor o ardor al Lydia Kells, orina menos de lo habitual o tiene [place] rosada o sanguinolenta  · Usted tiene preguntas o inquietudes acerca de green condición o cuidado  Cómo cuidarse en esta etapa de green embarazo:   · Consuma alimentos saludables y variados  Alimentos saludables incluyen frutas, verduras, panes de mike integral, alimentos lácteos bajos en grasa, frijoles, sultana magras y pescado  [place] líquidos stefanie se le haya indicado  Pregunte cuánto líquido debe rafa cada día y cuáles líquidos son los más adecuados para usted  Limite el consumo de cafeína a menos de [address] [address]  Limite el consumo de pescado a 2 porciones cada semana  Escoja pescado con concentraciones bajas de mis stefanie atún ligero enlatado, camarón, cangrejo, salmón, bacalao o tilapia  No  coma pescado con concentraciones altas de mis stefanie pez peggy, caballa gigante, pargo rayado y tiburón  · [address] [address] [place]  Iraheta necesidad de ciertas vitaminas y [address] [facility], stefanie el ácido fólico, aumenta bright el [facility]  Las vitaminas prenatales proporcionan algunas de las vitaminas y minerales adicionales que usted necesita  Las vitaminas prenatales también podrían ayudar a disminuir el riesgo de ciertos defectos de nacimiento  · Descanse tanto stefanie sea necesario  Levante xochilt pies si tiene inflamación en xochilt tobillos y pies  · No fume  Si usted fuma, nunca es demasiado tarde para dejar de hacerlo  Fumar aumenta el riesgo de aborto espontáneo y otros problemas de anne bright iraheta [facility]  Fumar puede causar que iraheta bebé nazca antes de tiempo o que pese menos al nacer  Solicite información a iraheta médico si usted necesita ayuda para dejar de fumar  · No consuma alcohol  El alcohol pasa de iraheta cuerpo al bebé a través de la placenta  Puede afectar el desarrollo del cerebro de iraheta bebé y provocar el síndrome de alcoholismo fetal (SAF)  FAS es un mahendra de condiciones que causan [address] [place] [address] [address], de comportamiento y de crecimiento  · Consulte con iraheta médico antes de rafa cualquier medicamento  Muchos medicamentos pueden perjudicar a iraheta bebé si usted los paulo [address] [facility]  No tome ningún medicamento, vitaminas, hierbas o suplementos sin ricci consultar con iraheta Rita Winsome  use drogas ilegales o de la moraes (stefanie marihuana o cocaína) mientras está embarazada  · Hable con iraheta médico antes de viajar  Es posible que no pueda viajar en avión después de las 36 11 [facility]  También le puede recomendar que evite largos viajes por carretera  Consejos de seguridad bright el embarazo:   · Evite jacuzzis y saunas  No use un jacuzzi o un sauna mientras usted está embarazada, especialmente bright el primer trimestre  Los [place] [facility] y los saunas aumentan la temperatura de iraheta bebé y el riesgo de defectos de nacimiento  · Evite la toxoplasmosis    [place] es Denys Sparks causada por comer carne cruda o estar cerca del excremento de un darci infectado  [place] puede causar malformaciones congénitas, aborto espontáneo y Elieser Schein  Lávese las vickie después de tocar carne cruda  Asegúrese de que la carne esté drea cocida antes de comerla  Evite los huevos crudos y la Arvella Brooks  Use guantes o pida que alguien la ayude a limpiar la caja de arena del darci mientras usted Lawanda Baker  · Consulte con iraheta médico acerca de viajar  El [address] cómodo para viajar es bright el jaxson trimestre  Pregunte a iraheta médico si usted puede viajar después de las 36 semanas  Es posible que no pueda viajar en avión después de las 36 [address]  También le puede recomendar que evite largos viajes por carretera  Cambios que están ocurriendo con iraheta bebé:  Para las 38 semanas, iraheta bebé podría pesar entre 6 y 5 libras  Iraheta bebé podría medir alrededor de 14 pulgadas de pete desde la punta de la michael hasta la rabadilla (parte inferior del bebé)  Iraheta bebé escucha lo suficientemente drea stefanie para reconocer iraheta voz  Conforme iraheta bebé crece más, usted podría sentir menos patadas y sentir más que iraheta bebé se estira y [place]  Iraheta bebé podría moverse en posición con la michael hacia abajo  Iraheta bebé también descansará en la parte inferior de iraheta abdomen  Lo que necesita saber acerca del cuidado prenatal:  Iraheta médico le revisará iraheta presión arterial y [place]  Es posible que también necesite lo siguiente:  · Un examen de orina  también podría realizarse para revisarle el azúcar y la proteína  Estas son señales de diabetes gestacional o de infección  La proteína en iraheta orina también podría ser adam señal de preeclampsia  La preeclampsia es adam condición que puede desarrollarse bright la semana 21 o después en iraheta embarazo  Esta provoca presión arterial avelino y [employer] con xochilt riñones y [place]       · Los análisis de sarah  se pueden realizar para revisar si tiene signos de anemia (nivel bajo del aliya)  · La vacuna Tdap  podría ser recomendado por iraheta médico      · El examen del estreptococo del mahendra B  es un examen que se realiza para verificar si hay infección por estreptococos del mahendra B  El estreptococo del mahendra B es un tipo de bacteria que se puede encontrar en la vagina o el recto  Podría ser transmitida a iraheta bebé bright el parto si usted la tiene  Iraheta médico podría rafa Loletta Scales de iraheta vagina o recto y blank la [place] al laboratorio para que la examinen  · La altura uterina  es adam medición del útero para controlar el desarrollo de iraheta bebé  [employer] por lo general es igual al número de 11 [address] [place] que usted tiene de embarazo  Iraheta médico también podría revisar la posición de iraheta bebé  · El ritmo cardíaco de iraheta bebé  será revisado  © 2017 [address] Information is for End User's use only and may not be sold, redistributed or otherwise used for commercial purposes  All illustrations and images included in CareNotes® are the copyrighted property of A D A M , Inc  or Brian Sanderson  Esta información es sólo para uso en educación  Iraheta intención no es darle un consejo médico sobre enfermedades o tratamientos  Colsulte con iraheta Ozell Fabry farmacéutico antes de seguir cualquier régimen médico para saber si es seguro y efectivo para usted  Well appearing, awake, alert, oriented to person, place, time/situation and in no apparent distress. normal...